# Patient Record
Sex: MALE | Race: WHITE | NOT HISPANIC OR LATINO | Employment: OTHER | URBAN - NONMETROPOLITAN AREA
[De-identification: names, ages, dates, MRNs, and addresses within clinical notes are randomized per-mention and may not be internally consistent; named-entity substitution may affect disease eponyms.]

---

## 2022-04-20 ENCOUNTER — LAB (OUTPATIENT)
Dept: LAB | Facility: HOSPITAL | Age: 67
End: 2022-04-20

## 2022-04-20 ENCOUNTER — CONSULT (OUTPATIENT)
Dept: ONCOLOGY | Facility: CLINIC | Age: 67
End: 2022-04-20

## 2022-04-20 VITALS
SYSTOLIC BLOOD PRESSURE: 140 MMHG | OXYGEN SATURATION: 98 % | BODY MASS INDEX: 26.81 KG/M2 | WEIGHT: 181 LBS | HEART RATE: 58 BPM | DIASTOLIC BLOOD PRESSURE: 88 MMHG | HEIGHT: 69 IN | RESPIRATION RATE: 16 BRPM | TEMPERATURE: 97.1 F

## 2022-04-20 DIAGNOSIS — R53.81 MALAISE AND FATIGUE: ICD-10-CM

## 2022-04-20 DIAGNOSIS — D75.839 THROMBOCYTOSIS, UNSPECIFIED: Primary | ICD-10-CM

## 2022-04-20 DIAGNOSIS — R53.83 MALAISE AND FATIGUE: ICD-10-CM

## 2022-04-20 DIAGNOSIS — R79.89 OTHER SPECIFIED ABNORMAL FINDINGS OF BLOOD CHEMISTRY: ICD-10-CM

## 2022-04-20 DIAGNOSIS — D75.839 THROMBOCYTOSIS, UNSPECIFIED: ICD-10-CM

## 2022-04-20 DIAGNOSIS — Z72.0 TOBACCO USE: ICD-10-CM

## 2022-04-20 PROBLEM — E78.5 HYPERLIPIDEMIA: Status: ACTIVE | Noted: 2022-04-20

## 2022-04-20 LAB
ALBUMIN SERPL-MCNC: 4.5 G/DL (ref 3.5–5.2)
ALBUMIN/GLOB SERPL: 1.6 G/DL
ALP SERPL-CCNC: 82 U/L (ref 39–117)
ALT SERPL W P-5'-P-CCNC: 14 U/L (ref 1–41)
ANION GAP SERPL CALCULATED.3IONS-SCNC: 9 MMOL/L (ref 5–15)
AST SERPL-CCNC: 16 U/L (ref 1–40)
BASOPHILS # BLD AUTO: 0.03 10*3/MM3 (ref 0–0.2)
BASOPHILS NFR BLD AUTO: 0.5 % (ref 0–1.5)
BILIRUB SERPL-MCNC: 0.4 MG/DL (ref 0–1.2)
BUN SERPL-MCNC: 18 MG/DL (ref 8–23)
BUN/CREAT SERPL: 24 (ref 7–25)
CALCIUM SPEC-SCNC: 9.5 MG/DL (ref 8.6–10.5)
CHLORIDE SERPL-SCNC: 103 MMOL/L (ref 98–107)
CO2 SERPL-SCNC: 26 MMOL/L (ref 22–29)
CREAT SERPL-MCNC: 0.75 MG/DL (ref 0.76–1.27)
CRP SERPL-MCNC: <0.3 MG/DL (ref 0–0.5)
DEPRECATED RDW RBC AUTO: 46.5 FL (ref 37–54)
EGFRCR SERPLBLD CKD-EPI 2021: 99.5 ML/MIN/1.73
EOSINOPHIL # BLD AUTO: 0.14 10*3/MM3 (ref 0–0.4)
EOSINOPHIL NFR BLD AUTO: 2.2 % (ref 0.3–6.2)
ERYTHROCYTE [DISTWIDTH] IN BLOOD BY AUTOMATED COUNT: 13.7 % (ref 12.3–15.4)
FERRITIN SERPL-MCNC: 171.2 NG/ML (ref 30–400)
FOLATE SERPL-MCNC: 12.4 NG/ML (ref 4.78–24.2)
GLOBULIN UR ELPH-MCNC: 2.8 GM/DL
GLUCOSE SERPL-MCNC: 113 MG/DL (ref 65–99)
HCT VFR BLD AUTO: 41.2 % (ref 37.5–51)
HGB BLD-MCNC: 13.6 G/DL (ref 13–17.7)
IMM GRANULOCYTES # BLD AUTO: 0.03 10*3/MM3 (ref 0–0.05)
IMM GRANULOCYTES NFR BLD AUTO: 0.5 % (ref 0–0.5)
IRON 24H UR-MRATE: 73 MCG/DL (ref 59–158)
IRON SATN MFR SERPL: 19 % (ref 20–50)
LDH SERPL-CCNC: 227 U/L (ref 135–225)
LYMPHOCYTES # BLD AUTO: 1.89 10*3/MM3 (ref 0.7–3.1)
LYMPHOCYTES NFR BLD AUTO: 29.6 % (ref 19.6–45.3)
MCH RBC QN AUTO: 30.6 PG (ref 26.6–33)
MCHC RBC AUTO-ENTMCNC: 33 G/DL (ref 31.5–35.7)
MCV RBC AUTO: 92.6 FL (ref 79–97)
MONOCYTES # BLD AUTO: 0.55 10*3/MM3 (ref 0.1–0.9)
MONOCYTES NFR BLD AUTO: 8.6 % (ref 5–12)
NEUTROPHILS NFR BLD AUTO: 3.74 10*3/MM3 (ref 1.7–7)
NEUTROPHILS NFR BLD AUTO: 58.6 % (ref 42.7–76)
NRBC BLD AUTO-RTO: 0 /100 WBC (ref 0–0.2)
PLATELET # BLD AUTO: 563 10*3/MM3 (ref 140–450)
PMV BLD AUTO: 8.4 FL (ref 6–12)
POTASSIUM SERPL-SCNC: 4.7 MMOL/L (ref 3.5–5.2)
PROT SERPL-MCNC: 7.3 G/DL (ref 6–8.5)
RBC # BLD AUTO: 4.45 10*6/MM3 (ref 4.14–5.8)
SODIUM SERPL-SCNC: 138 MMOL/L (ref 136–145)
TIBC SERPL-MCNC: 393 MCG/DL (ref 298–536)
TRANSFERRIN SERPL-MCNC: 264 MG/DL (ref 200–360)
VIT B12 BLD-MCNC: 588 PG/ML (ref 211–946)
WBC NRBC COR # BLD: 6.38 10*3/MM3 (ref 3.4–10.8)

## 2022-04-20 PROCEDURE — 84466 ASSAY OF TRANSFERRIN: CPT

## 2022-04-20 PROCEDURE — 82728 ASSAY OF FERRITIN: CPT

## 2022-04-20 PROCEDURE — 82746 ASSAY OF FOLIC ACID SERUM: CPT

## 2022-04-20 PROCEDURE — 36415 COLL VENOUS BLD VENIPUNCTURE: CPT

## 2022-04-20 PROCEDURE — 83540 ASSAY OF IRON: CPT

## 2022-04-20 PROCEDURE — 82607 VITAMIN B-12: CPT

## 2022-04-20 PROCEDURE — 85025 COMPLETE CBC W/AUTO DIFF WBC: CPT

## 2022-04-20 PROCEDURE — 99204 OFFICE O/P NEW MOD 45 MIN: CPT | Performed by: NURSE PRACTITIONER

## 2022-04-20 PROCEDURE — 83615 LACTATE (LD) (LDH) ENZYME: CPT

## 2022-04-20 PROCEDURE — 86140 C-REACTIVE PROTEIN: CPT

## 2022-04-20 PROCEDURE — 80053 COMPREHEN METABOLIC PANEL: CPT

## 2022-04-20 RX ORDER — ASPIRIN 81 MG/1
81 TABLET ORAL DAILY
COMMUNITY

## 2022-04-20 RX ORDER — PRAVASTATIN SODIUM 40 MG
TABLET ORAL
COMMUNITY

## 2022-04-20 NOTE — PROGRESS NOTES
"[facility] HEMATOLOGY & ONCOLOGY  [address]  [facility] [address] [phone]    Patient Name: Austin Walton  Encounter Date: 04/20/2022  YOB: 1955  Patient Number: [id number]    Initial Note    REASON FOR CONSULTATION: Patient states \" high platelets \".    HISTORY OF PRESENT ILLNESS: Austin Walton is a 66 y.o. male referred by Henny Sanchez MD for diagnostic and management recommendations for thrombocytosis. History is obtained from patient. History is considered to be accurate.    He has health history significant for hypelipidemia.      He states that during his annual wellness visit in 2021, his platelets were elevated.  This year he had the same result so his provider referred him for evaluation.  The referring office note does not have labs attached.  It mentions only the history of elevated platelets and the last reading was 611.  Wife contacted that office during this visit to request those labs be forwarded to us.      He had colonscopy 4/14 at [facility], Dr. Chaudhari. Wife reports it was unremarkable other than slight enlarged prostate.     He has smoked for over 50 years and smokes approx 1 PPD or a little less.   He does report some chronic fatigue but has no acute complaint.    LABS    No results for input(s): HGB, HCT, MCV, WBC, RDW, MPV, PLT, AUTOIGPER, NEUTROABS, LYMPHSABS, MONOSABS, EOSABS, BASOSABS, AUTOIGNUM, NRBC, NEUTRELPCTM, MONOPCT, BASOPCT, ATYLMPCT, ANISOCYTOSIS, GIANTPLTS in the last 69615 hours.    Invalid input(s): LYMPHOCPCT, ABCMORPH    No results for input(s): GLUCOSE, NA, K, CO2, CL, ANIONGAP, CREATININE, BUN, BCR, CALCIUM, EGFRIFNONA, ALKPHOS, PROTEINTOT, ALT, AST, BILITOT, ALBUMIN, GLOB in the last 83782 hours.    Invalid input(s): LABIL    No results for input(s): MSPIKE, KAPPALAMB, IGLFLC, URICACID, FREEKAPPAL, CEA, LDH, REFLABREPO in the last 46951 hours.    No results for " input(s): IRON, TIBC, LABIRON, FERRITIN, Z9YHFHX, TSH, FOLATE in the last 28055 hours.    Invalid input(s): VITB12      PAST MEDICAL HISTORY:  ALLERGIES:  No Known Allergies  CURRENT MEDICATIONS:  Outpatient Encounter Medications as of 4/20/2022   Medication Sig Dispense Refill   • aspirin 81 MG EC tablet Take 81 mg by mouth Daily.     • pravastatin (PRAVACHOL) 40 MG tablet pravastatin 40 mg tablet   TAKE 1 TABLET BY MOUTH EVERY EVENING NEEDS APPT FOR REFILL       No facility-administered encounter medications on file as of 4/20/2022.     ADULT ILLNESSES:  Patient Active Problem List   Diagnosis Code   • Hyperlipidemia E78.5     SURGERIES:  Past Surgical History:   Procedure Laterality Date   • ANKLE SURGERY      screws in ankles   • CARPAL TUNNEL RELEASE     • COLONOSCOPY  04/13/2022   • HERNIA REPAIR     • TOE AMPUTATION       HEALTH MAINTENANCE ITEMS:  Health Maintenance Due   Topic Date Due   • COLORECTAL CANCER SCREENING  Never done   • TDAP/TD VACCINES (1 - Tdap) Never done   • ZOSTER VACCINE (1 of 2) Never done   • LUNG CANCER SCREENING  Never done   • Pneumococcal Vaccine 65+ (2 - PPSV23 or PCV20) 10/19/2021   • COVID-19 Vaccine (2 - Moderna 3-dose series) 12/01/2021   • HEPATITIS C SCREENING  Never done   • ANNUAL WELLNESS VISIT  Never done   • LIPID PANEL  Never done       <no information>  Last Completed Colonoscopy     This patient has no relevant Health Maintenance data.        Immunization History   Administered Date(s) Administered   • COVID-19 (MODERNA) 1st, 2nd, 3rd Dose Only 11/03/2021   • Flublok 18+yrs 10/19/2020   • Fluzone Split Quad (Multi-dose) 10/11/2018   • Influenza, Unspecified 11/06/2016, 10/11/2018   • Pneumococcal Conjugate 13-Valent (PCV13) 10/19/2020     Last Completed Mammogram     This patient has no relevant Health Maintenance data.            FAMILY HISTORY:  No family history on file.  SOCIAL HISTORY:  Social History     Socioeconomic History   • Marital status:     Tobacco Use   • Smoking status: Current Every Day Smoker     Packs/day: 0.50     Years: 50.00     Pack years: 25.00     Types: Cigarettes   • Smokeless tobacco: Never Used   Vaping Use   • Vaping Use: Never used   Substance and Sexual Activity   • Alcohol use: Not Currently   • Drug use: Not Currently   • Sexual activity: Defer       REVIEW OF SYSTEMS:  Review of Systems   Constitutional: Negative for activity change, appetite change, chills, diaphoresis, fatigue, fever, unexpected weight gain and unexpected weight loss.   HENT: Positive for congestion and sinus pressure. Negative for dental problem, ear discharge, ear pain, facial swelling, hearing loss, mouth sores, nosebleeds, rhinorrhea, sneezing, sore throat, swollen glands, tinnitus, trouble swallowing and voice change.    Eyes: Negative for blurred vision, double vision, photophobia, pain, discharge, redness, itching and visual disturbance.   Respiratory: Negative for apnea, cough, choking, chest tightness, shortness of breath, wheezing and stridor.    Cardiovascular: Negative for chest pain, palpitations and leg swelling.   Gastrointestinal: Negative for abdominal distention, abdominal pain, anal bleeding, blood in stool, constipation, diarrhea, nausea, rectal pain, vomiting, GERD and indigestion.   Endocrine: Negative for cold intolerance, heat intolerance, polydipsia, polyphagia and polyuria.   Genitourinary: Positive for frequency. Negative for breast discharge, decreased libido, decreased urine volume, difficulty urinating, discharge, dysuria, flank pain, genital sores, hematuria, nocturia, penile pain, erectile dysfunction, penile swelling, scrotal swelling, testicular pain, urgency and urinary incontinence.   Musculoskeletal: Positive for arthralgias and myalgias. Negative for back pain, gait problem, joint swelling, neck pain, neck stiffness and bursitis.   Skin: Negative for color change, dry skin, pallor, rash, skin lesions, wound and bruise.  "  Allergic/Immunologic: Negative for environmental allergies, food allergies and immunocompromised state.   Neurological: Negative for dizziness, tremors, seizures, syncope, facial asymmetry, speech difficulty, weakness, light-headedness, numbness, headache, memory problem and confusion.   Hematological: Negative for adenopathy. Does not bruise/bleed easily.   Psychiatric/Behavioral: Negative for agitation, behavioral problems, decreased concentration, dysphoric mood, hallucinations, self-injury, sleep disturbance, suicidal ideas, negative for hyperactivity, depressed mood and stress. The patient is not nervous/anxious.        /88   Pulse 58   Temp 97.1 °F (36.2 °C) (Temporal)   Resp 16   Ht 175.3 cm (69\")   Wt 82.1 kg (181 lb)   SpO2 98%   BMI 26.73 kg/m²  Body surface area is 1.98 meters squared.    Pain Score    04/20/22 0852   PainSc: 0-No pain       Physical Exam:  Physical Exam  Constitutional:       General: He is not in acute distress.     Appearance: Normal appearance. He is not ill-appearing.   HENT:      Head: Normocephalic and atraumatic.   Eyes:      General: No scleral icterus.        Right eye: No discharge.         Left eye: No discharge.      Pupils: Pupils are equal, round, and reactive to light.   Cardiovascular:      Rate and Rhythm: Normal rate and regular rhythm.      Heart sounds: No murmur heard.    No friction rub. No gallop.   Pulmonary:      Effort: Pulmonary effort is normal.      Breath sounds: Wheezing (Trace left upper lobe) present.   Abdominal:      General: Bowel sounds are normal.      Palpations: Abdomen is soft.      Tenderness: There is no abdominal tenderness. There is no guarding or rebound.   Musculoskeletal:         General: No swelling or tenderness. Normal range of motion.      Cervical back: Normal range of motion and neck supple. No tenderness.   Skin:     General: Skin is warm and dry.      Coloration: Skin is not jaundiced.      Findings: No bruising or " erythema.   Neurological:      General: No focal deficit present.      Mental Status: He is alert and oriented to person, place, and time.   Psychiatric:         Mood and Affect: Mood normal.         Behavior: Behavior normal.         Judgment: Judgment normal.         Austin Walton reports a pain score of 0.  No intervention is indicted.       ASSESSMENT / PLAN:    1. Thrombocytosis, unspecified    2. Malaise and fatigue    3. Tobacco use    4. Other specified abnormal findings of blood chemistry         PLAN:   1.   regarding the reason for the referral.   2.   regarding thrombocytosis and differential diagnosis considerations   3.  Labs for   Orders Placed This Encounter   Procedures   • CBC (No Diff)   • Comprehensive Metabolic Panel   • Lactate Dehydrogenase   • Vitamin B12   • Folate   • MPL Mutation Analysis   • C-reactive Protein   • Iron Profile   • Ferritin   • CBC & Differential      4.  Stable for close observation.   6.  Continue current medications.   7.  Continue care per primary care physician.   8.  Care discussed with patient.  Understanding expressed.  Patient agreeable with plan.   9.  Return to office in one week to review labs and discuss treatment plan.  10.  Further recommendations pending.  11.  Pt advised smoking cessation.     Thank you for the referral.    I spent 40 minutes caring for Austin on this date of service. This time includes time spent by me in the following activities: preparing for the visit, obtaining and/or reviewing a separately obtained history, performing a medically appropriate examination and/or evaluation, counseling and educating the patient/family/caregiver, ordering medications, tests, or procedures and documenting information in the medical record.

## 2022-04-27 LAB — REF LAB TEST METHOD: NORMAL

## 2022-04-28 ENCOUNTER — OFFICE VISIT (OUTPATIENT)
Dept: ONCOLOGY | Facility: CLINIC | Age: 67
End: 2022-04-28

## 2022-04-28 ENCOUNTER — PATIENT ROUNDING (BHMG ONLY) (OUTPATIENT)
Dept: ONCOLOGY | Facility: CLINIC | Age: 67
End: 2022-04-28

## 2022-04-28 VITALS
HEIGHT: 69 IN | TEMPERATURE: 97.4 F | OXYGEN SATURATION: 99 % | WEIGHT: 179.4 LBS | SYSTOLIC BLOOD PRESSURE: 122 MMHG | RESPIRATION RATE: 16 BRPM | BODY MASS INDEX: 26.57 KG/M2 | DIASTOLIC BLOOD PRESSURE: 82 MMHG | HEART RATE: 72 BPM

## 2022-04-28 DIAGNOSIS — D75.839 THROMBOCYTOSIS, UNSPECIFIED: ICD-10-CM

## 2022-04-28 DIAGNOSIS — D50.9 IRON DEFICIENCY ANEMIA, UNSPECIFIED IRON DEFICIENCY ANEMIA TYPE: Primary | ICD-10-CM

## 2022-04-28 LAB — REF LAB TEST METHOD: NORMAL

## 2022-04-28 PROCEDURE — 99213 OFFICE O/P EST LOW 20 MIN: CPT | Performed by: NURSE PRACTITIONER

## 2022-04-28 RX ORDER — DOXYCYCLINE HYCLATE 50 MG/1
324 CAPSULE, GELATIN COATED ORAL
Qty: 30 TABLET | Refills: 2 | Status: SHIPPED | OUTPATIENT
Start: 2022-04-28

## 2022-04-28 RX ORDER — IBUPROFEN 200 MG
200 TABLET ORAL EVERY 6 HOURS PRN
COMMUNITY

## 2022-04-28 NOTE — PROGRESS NOTES
[facility] HEMATOLOGY & ONCOLOGY  [address]  [facility] [address] [phone]    Patient Name: Austin Walton  Encounter Date: 04/28/2022  YOB: 1955  Patient Number: [id number]    Hematology / Oncology Progress Note    CHIEF COMPLAINT:  Review labs    HPI / REASON FOR VISIT: Austin Walton is a 66 y.o. male who is followed by this office for elevated platelets.     He states that during his annual wellness visit in 2021, his platelets were elevated.  This year he had the same result so his provider referred him for evaluation.  The referring office note does not have labs attached.  It mentions only the history of elevated platelets and the last reading was 611.  Wife contacted that office during this visit to request those labs be forwarded to us.       He had colonscopy 4/14 at [facility], Dr. Chaudhari. Wife reports it was unremarkable other than slight enlarged prostate.      He has smoked for over 50 years and smokes approx 1 PPD or a little less.   He does report some chronic fatigue but has no acute complaint.    He was seen in our office on 4/20/22 for consult.  He presents today to review labs drawn at that visit.  LDH elevated at 227.  Iron saturation slightly decreased 19%.  CBC unremarkable other than elevated platelets 563.      MPL and LACY 2 profiles were both negative.       LABS    Lab Results - Last 18 Months   Lab Units 04/20/22  0945   HEMOGLOBIN g/dL 13.6   HEMATOCRIT % 41.2   MCV fL 92.6   WBC 10*3/mm3 6.38   RDW % 13.7   MPV fL 8.4   PLATELETS 10*3/mm3 563*   IMM GRAN % % 0.5   NEUTROS ABS 10*3/mm3 3.74   LYMPHS ABS 10*3/mm3 1.89   MONOS ABS 10*3/mm3 0.55   EOS ABS 10*3/mm3 0.14   BASOS ABS 10*3/mm3 0.03   IMMATURE GRANS (ABS) 10*3/mm3 0.03   NRBC /100 WBC 0.0       Lab Results - Last 18 Months   Lab Units 04/20/22  0945   GLUCOSE mg/dL 113*   SODIUM mmol/L 138   POTASSIUM mmol/L 4.7   CO2 mmol/L  26.0   CHLORIDE mmol/L 103   ANION GAP mmol/L 9.0   CREATININE mg/dL 0.75*   BUN mg/dL 18   BUN / CREAT RATIO  24.0   CALCIUM mg/dL 9.5   ALK PHOS U/L 82   TOTAL PROTEIN g/dL 7.3   ALT (SGPT) U/L 14   AST (SGOT) U/L 16   BILIRUBIN mg/dL 0.4   ALBUMIN g/dL 4.50   GLOBULIN gm/dL 2.8       Lab Results - Last 18 Months   Lab Units 04/20/22  0945   LDH U/L 227*   REFERENCE LAB REPORT  See Attached Report  See Attached Report       Lab Results - Last 18 Months   Lab Units 04/20/22  0945   IRON mcg/dL 73   TIBC mcg/dL 393   IRON SATURATION % 19*   FERRITIN ng/mL 171.20   FOLATE ng/mL 12.40         PAST MEDICAL HISTORY:  ALLERGIES:  No Known Allergies  CURRENT MEDICATIONS:  Outpatient Encounter Medications as of 4/28/2022   Medication Sig Dispense Refill   • aspirin 81 MG EC tablet Take 81 mg by mouth Daily.     • ibuprofen (ADVIL,MOTRIN) 200 MG tablet Take 200 mg by mouth Every 6 (Six) Hours As Needed for Mild Pain .     • pravastatin (PRAVACHOL) 40 MG tablet pravastatin 40 mg tablet   TAKE 1 TABLET BY MOUTH EVERY EVENING NEEDS APPT FOR REFILL     • ferrous gluconate (FERGON) 324 MG tablet Take 1 tablet by mouth Daily With Breakfast. 30 tablet 2     No facility-administered encounter medications on file as of 4/28/2022.     ADULT ILLNESSES:  Patient Active Problem List   Diagnosis Code   • Hyperlipidemia E78.5     SURGERIES:  Past Surgical History:   Procedure Laterality Date   • ANKLE SURGERY      screws in ankles   • CARPAL TUNNEL RELEASE     • COLONOSCOPY  04/13/2022   • HERNIA REPAIR     • TOE AMPUTATION       HEALTH MAINTENANCE ITEMS:  Health Maintenance Due   Topic Date Due   • COLORECTAL CANCER SCREENING  Never done   • TDAP/TD VACCINES (1 - Tdap) Never done   • ZOSTER VACCINE (1 of 2) Never done   • LUNG CANCER SCREENING  Never done   • Pneumococcal Vaccine 65+ (2 - PPSV23 or PCV20) 10/19/2021   • COVID-19 Vaccine (2 - Moderna 3-dose series) 12/01/2021   • HEPATITIS C SCREENING  Never done   • ANNUAL WELLNESS  "VISIT  Never done   • LIPID PANEL  Never done       <no information>  Last Completed Colonoscopy     This patient has no relevant Health Maintenance data.        Immunization History   Administered Date(s) Administered   • COVID-19 (MODERNA) 1st, 2nd, 3rd Dose Only 11/03/2021   • Flublok 18+yrs 10/19/2020   • Fluzone Split Quad (Multi-dose) 10/11/2018   • Influenza, Unspecified 11/06/2016, 10/11/2018   • Pneumococcal Conjugate 13-Valent (PCV13) 10/19/2020     Last Completed Mammogram     This patient has no relevant Health Maintenance data.            FAMILY HISTORY:  No family history on file.  SOCIAL HISTORY:  Social History     Socioeconomic History   • Marital status:    Tobacco Use   • Smoking status: Current Every Day Smoker     Packs/day: 0.50     Years: 50.00     Pack years: 25.00     Types: Cigarettes   • Smokeless tobacco: Never Used   Vaping Use   • Vaping Use: Never used   Substance and Sexual Activity   • Alcohol use: Not Currently   • Drug use: Not Currently   • Sexual activity: Defer       REVIEW OF SYSTEMS:  Review of Systems    /82   Pulse 72   Temp 97.4 °F (36.3 °C) (Temporal)   Resp 16   Ht 175.3 cm (69\")   Wt 81.4 kg (179 lb 6.4 oz)   SpO2 99%   BMI 26.49 kg/m²  Body surface area is 1.97 meters squared.    Pain Score    04/28/22 1024   PainSc: 0-No pain       Physical Exam:  Physical Exam  Constitutional:       Appearance: Normal appearance.   HENT:      Head: Normocephalic.   Eyes:      Extraocular Movements: Extraocular movements intact.   Cardiovascular:      Rate and Rhythm: Normal rate and regular rhythm.   Pulmonary:      Effort: Pulmonary effort is normal.      Breath sounds: Normal breath sounds.   Abdominal:      General: Bowel sounds are normal. There is no distension.      Tenderness: There is no abdominal tenderness.   Musculoskeletal:         General: Normal range of motion.   Skin:     General: Skin is warm and dry.   Neurological:      General: No focal deficit " present.      Mental Status: He is alert and oriented to person, place, and time.   Psychiatric:         Mood and Affect: Mood normal.         Behavior: Behavior normal.         Austin Walton reports a pain score of 0.  No intervention indicated.         ASSESSMENT / PLAN    1. Iron deficiency anemia, unspecified iron deficiency anemia type    2. Thrombocytosis, unspecified         ASSESSMENT:    1.  Iron saturation  slightly low at 19%  2.  Platelets are elevated at 563 which can be reactive.  3.  LACY 2 [id number] not detected  4.  MPL mutation not detected.    PLAN:  1.  Pt will take low dose iron daily.   2.  Pt advised oral iron can cause constipation and dark stool.   3.  He can take colace / Miralax as needed.    4.  Pt will continue all medications and treatment plans per PCP and any other providers.    5.  He will RTC in 6 weeks.  6.  Patient will have preoffice labs.   We will monitor for improvement in platelets with improvement of iron.  Orders Placed This Encounter   Procedures   • Comprehensive Metabolic Panel   • Iron Profile   • Ferritin   • CBC & Differential     Care discussed with patient.  Understanding expressed.  Patient agreeable with plan.    I spent 20 minutes caring for Austin on this date of service. This time includes time spent by me in the following activities: preparing for the visit, reviewing tests, performing a medically appropriate examination and/or evaluation, counseling and educating the patient/family/caregiver, ordering medications, tests, or procedures and documenting information in the medical record.

## 2022-04-28 NOTE — PROGRESS NOTES
April 28, 2022    Hello, may I speak with Austin Walton?    My name is MAINE    I am  with [facility] HEMATOLOGY & ONCOLOGY  [address]  [facility] [facility] [address] [phone].    Before we get started may I verify your date of birth? 1955    I am calling to officially welcome you to our practice and ask about your recent visit. Is this a good time to talk? YES    Tell me about your visit with us. What things went well?  EVERYTHING WENT GOOD        We're always looking for ways to make our patients' experiences even better. Do you have recommendations on ways we may improve?  NO I DON'T    Overall were you satisfied with your first visit to our practice? YES       I appreciate you taking the time to speak with me today. Is there anything else I can do for you? NO      Thank you, and have a great day.

## 2022-06-09 ENCOUNTER — LAB (OUTPATIENT)
Dept: LAB | Facility: HOSPITAL | Age: 67
End: 2022-06-09

## 2022-06-09 ENCOUNTER — OFFICE VISIT (OUTPATIENT)
Dept: ONCOLOGY | Facility: CLINIC | Age: 67
End: 2022-06-09

## 2022-06-09 VITALS
WEIGHT: 180.9 LBS | OXYGEN SATURATION: 96 % | BODY MASS INDEX: 26.79 KG/M2 | TEMPERATURE: 97.2 F | DIASTOLIC BLOOD PRESSURE: 88 MMHG | RESPIRATION RATE: 16 BRPM | HEIGHT: 69 IN | HEART RATE: 92 BPM | SYSTOLIC BLOOD PRESSURE: 128 MMHG

## 2022-06-09 DIAGNOSIS — E61.1 IRON DEFICIENCY: ICD-10-CM

## 2022-06-09 DIAGNOSIS — Z12.2 SCREENING FOR LUNG CANCER: ICD-10-CM

## 2022-06-09 DIAGNOSIS — D75.839 THROMBOCYTOSIS, UNSPECIFIED: ICD-10-CM

## 2022-06-09 DIAGNOSIS — D50.9 IRON DEFICIENCY ANEMIA, UNSPECIFIED IRON DEFICIENCY ANEMIA TYPE: ICD-10-CM

## 2022-06-09 DIAGNOSIS — Z72.0 TOBACCO USE: Primary | ICD-10-CM

## 2022-06-09 LAB
ALBUMIN SERPL-MCNC: 4.5 G/DL (ref 3.5–5.2)
ALBUMIN/GLOB SERPL: 1.5 G/DL
ALP SERPL-CCNC: 95 U/L (ref 39–117)
ALT SERPL W P-5'-P-CCNC: 14 U/L (ref 1–41)
ANION GAP SERPL CALCULATED.3IONS-SCNC: 9 MMOL/L (ref 5–15)
AST SERPL-CCNC: 14 U/L (ref 1–40)
BASOPHILS # BLD AUTO: 0.04 10*3/MM3 (ref 0–0.2)
BASOPHILS NFR BLD AUTO: 0.5 % (ref 0–1.5)
BILIRUB SERPL-MCNC: 0.4 MG/DL (ref 0–1.2)
BUN SERPL-MCNC: 19 MG/DL (ref 8–23)
BUN/CREAT SERPL: 23.5 (ref 7–25)
CALCIUM SPEC-SCNC: 9.8 MG/DL (ref 8.6–10.5)
CHLORIDE SERPL-SCNC: 101 MMOL/L (ref 98–107)
CO2 SERPL-SCNC: 26 MMOL/L (ref 22–29)
CREAT SERPL-MCNC: 0.81 MG/DL (ref 0.76–1.27)
CYTOLOGIST CVX/VAG CYTO: NORMAL
DEPRECATED RDW RBC AUTO: 45.1 FL (ref 37–54)
EGFRCR SERPLBLD CKD-EPI 2021: 96.6 ML/MIN/1.73
EOSINOPHIL # BLD AUTO: 0.11 10*3/MM3 (ref 0–0.4)
EOSINOPHIL NFR BLD AUTO: 1.3 % (ref 0.3–6.2)
ERYTHROCYTE [DISTWIDTH] IN BLOOD BY AUTOMATED COUNT: 13.2 % (ref 12.3–15.4)
FERRITIN SERPL-MCNC: 143.9 NG/ML (ref 30–400)
GLOBULIN UR ELPH-MCNC: 3.1 GM/DL
GLUCOSE SERPL-MCNC: 107 MG/DL (ref 65–99)
HCT VFR BLD AUTO: 45 % (ref 37.5–51)
HGB BLD-MCNC: 14.9 G/DL (ref 13–17.7)
IMM GRANULOCYTES # BLD AUTO: 0.03 10*3/MM3 (ref 0–0.05)
IMM GRANULOCYTES NFR BLD AUTO: 0.4 % (ref 0–0.5)
IRON 24H UR-MRATE: 78 MCG/DL (ref 59–158)
IRON SATN MFR SERPL: 18 % (ref 20–50)
LYMPHOCYTES # BLD AUTO: 2.68 10*3/MM3 (ref 0.7–3.1)
LYMPHOCYTES NFR BLD AUTO: 31.6 % (ref 19.6–45.3)
MCH RBC QN AUTO: 30.4 PG (ref 26.6–33)
MCHC RBC AUTO-ENTMCNC: 33.1 G/DL (ref 31.5–35.7)
MCV RBC AUTO: 91.8 FL (ref 79–97)
MONOCYTES # BLD AUTO: 0.77 10*3/MM3 (ref 0.1–0.9)
MONOCYTES NFR BLD AUTO: 9.1 % (ref 5–12)
NEUTROPHILS NFR BLD AUTO: 4.84 10*3/MM3 (ref 1.7–7)
NEUTROPHILS NFR BLD AUTO: 57.1 % (ref 42.7–76)
NRBC BLD AUTO-RTO: 0 /100 WBC (ref 0–0.2)
PATH INTERP BLD-IMP: NORMAL
PLATELET # BLD AUTO: 578 10*3/MM3 (ref 140–450)
PMV BLD AUTO: 8.2 FL (ref 6–12)
POTASSIUM SERPL-SCNC: 4.2 MMOL/L (ref 3.5–5.2)
PROT SERPL-MCNC: 7.6 G/DL (ref 6–8.5)
RBC # BLD AUTO: 4.9 10*6/MM3 (ref 4.14–5.8)
SODIUM SERPL-SCNC: 136 MMOL/L (ref 136–145)
TIBC SERPL-MCNC: 426 MCG/DL (ref 298–536)
TRANSFERRIN SERPL-MCNC: 286 MG/DL (ref 200–360)
WBC NRBC COR # BLD: 8.47 10*3/MM3 (ref 3.4–10.8)

## 2022-06-09 PROCEDURE — 84466 ASSAY OF TRANSFERRIN: CPT

## 2022-06-09 PROCEDURE — 99497 ADVNCD CARE PLAN 30 MIN: CPT | Performed by: NURSE PRACTITIONER

## 2022-06-09 PROCEDURE — 80053 COMPREHEN METABOLIC PANEL: CPT

## 2022-06-09 PROCEDURE — 99214 OFFICE O/P EST MOD 30 MIN: CPT | Performed by: NURSE PRACTITIONER

## 2022-06-09 PROCEDURE — 85025 COMPLETE CBC W/AUTO DIFF WBC: CPT

## 2022-06-09 PROCEDURE — 82728 ASSAY OF FERRITIN: CPT

## 2022-06-09 PROCEDURE — 36415 COLL VENOUS BLD VENIPUNCTURE: CPT

## 2022-06-09 PROCEDURE — 85060 BLOOD SMEAR INTERPRETATION: CPT

## 2022-06-09 PROCEDURE — 83540 ASSAY OF IRON: CPT

## 2022-06-09 NOTE — PROGRESS NOTES
[facility] GROUP HEMATOLOGY & ONCOLOGY  [address]  [facility] [place] [address] [phone]    Patient Name: Austin Walton  Encounter Date: 06/09/2022  YOB: 1955  Patient Number: [id number]    Hematology / Oncology Progress Note    CHIEF COMPLAINT:  Review labs    HPI / REASON FOR VISIT: Austin Walton is a 67 y.o. male who is followed by this office for elevated platelets.     He states that during his annual wellness visit in 2021, his platelets were elevated.  This year he had the same result so his provider referred him for evaluation.  The referring office note does not have labs attached.  It mentions only the history of elevated platelets and the last reading was 611.  Wife contacted that office during this visit to request those labs be forwarded to us.       He had colonscopy 4/14 at [facility], Dr. Chaudhari. Wife reports it was unremarkable other than slightly enlarged prostate.      He has smoked for over 50 years and smokes approx 1 PPD or a little less.   He does report some chronic fatigue but has no acute complaint.    He was seen in our office on 4/20/22 for consult.  He presents today to review labs drawn at that visit.  LDH elevated at 227.  Iron saturation slightly decreased 19%.  CBC unremarkable other than elevated platelets 563.      MPL and LACY 2 profiles were both negative.  He was started on oral iron related to the very slightly low saturation as iron deficiency can cause thrombocytosis.    06/09/2022 Follow Up    Pt presents to clinic today with his wife to follow up from starting  Oral iron daily.  He has tolerated it well and had no complaint today.     Labs were drawn and reviewed with patient.  CBC with Hgb 14.9, Hct 45, Plt 578.  Anemia panel hadn't resulted at time of visit.     Peripheral Smear:  Isolated mild thrombocytosis (by indices).  Per the electronic medical record, the patient has a  minimally decreased iron saturation, but no evidence of true iron deficiency anemia by CBC parameters at this time.  While mild thrombocytosis can be associated with iron deficiency, other causes of elevated platelet counts include infection/inflammatory conditions (i.e. acute phase reactant), certain medications, other stressors, etc.  Apparently, the patient did have molecular markers for MPN tested (JAK2 and MPL), which were reportedly negative.     No discrete dyspoiesis is identified on peripheral smear.  No significant left-shift, blasts, or other overt abnormalities.      LABS    Lab Results - Last 18 Months   Lab Units 06/09/22  0956 04/20/22  0945   HEMOGLOBIN g/dL 14.9 13.6   HEMATOCRIT % 45.0 41.2   MCV fL 91.8 92.6   WBC 10*3/mm3 8.47 6.38   RDW % 13.2 13.7   MPV fL 8.2 8.4   PLATELETS 10*3/mm3 578* 563*   IMM GRAN % % 0.4 0.5   NEUTROS ABS 10*3/mm3 4.84 3.74   LYMPHS ABS 10*3/mm3 2.68 1.89   MONOS ABS 10*3/mm3 0.77 0.55   EOS ABS 10*3/mm3 0.11 0.14   BASOS ABS 10*3/mm3 0.04 0.03   IMMATURE GRANS (ABS) 10*3/mm3 0.03 0.03   NRBC /100 WBC 0.0 0.0       Lab Results - Last 18 Months   Lab Units 06/09/22  0956 04/20/22  0945   GLUCOSE mg/dL 107* 113*   SODIUM mmol/L 136 138   POTASSIUM mmol/L 4.2 4.7   CO2 mmol/L 26.0 26.0   CHLORIDE mmol/L 101 103   ANION GAP mmol/L 9.0 9.0   CREATININE mg/dL 0.81 0.75*   BUN mg/dL 19 18   BUN / CREAT RATIO  23.5 24.0   CALCIUM mg/dL 9.8 9.5   ALK PHOS U/L 95 82   TOTAL PROTEIN g/dL 7.6 7.3   ALT (SGPT) U/L 14 14   AST (SGOT) U/L 14 16   BILIRUBIN mg/dL 0.4 0.4   ALBUMIN g/dL 4.50 4.50   GLOBULIN gm/dL 3.1 2.8       Lab Results - Last 18 Months   Lab Units 04/20/22  0945   LDH U/L 227*   REFERENCE LAB REPORT  See Attached Report  See Attached Report       Lab Results - Last 18 Months   Lab Units 06/09/22  0956 04/20/22  0945   IRON mcg/dL 78 73   TIBC mcg/dL 426 393   IRON SATURATION % 18* 19*   FERRITIN ng/mL 143.90 171.20   FOLATE ng/mL  --  12.40         PAST MEDICAL  HISTORY:  ALLERGIES:  No Known Allergies  CURRENT MEDICATIONS:  Outpatient Encounter Medications as of 6/9/2022   Medication Sig Dispense Refill   • aspirin 81 MG EC tablet Take 81 mg by mouth Daily.     • ferrous gluconate (FERGON) 324 MG tablet Take 1 tablet by mouth Daily With Breakfast. 30 tablet 2   • ibuprofen (ADVIL,MOTRIN) 200 MG tablet Take 200 mg by mouth Every 6 (Six) Hours As Needed for Mild Pain .     • pravastatin (PRAVACHOL) 40 MG tablet pravastatin 40 mg tablet   TAKE 1 TABLET BY MOUTH EVERY EVENING NEEDS APPT FOR REFILL       No facility-administered encounter medications on file as of 6/9/2022.     ADULT ILLNESSES:  Patient Active Problem List   Diagnosis Code   • Hyperlipidemia E78.5     SURGERIES:  Past Surgical History:   Procedure Laterality Date   • ANKLE SURGERY      screws in ankles   • CARPAL TUNNEL RELEASE     • COLONOSCOPY  04/13/2022   • HERNIA REPAIR     • TOE AMPUTATION       HEALTH MAINTENANCE ITEMS:  Health Maintenance Due   Topic Date Due   • COLORECTAL CANCER SCREENING  Never done   • TDAP/TD VACCINES (1 - Tdap) Never done   • ZOSTER VACCINE (1 of 2) Never done   • LUNG CANCER SCREENING  Never done   • Pneumococcal Vaccine 65+ (2 - PPSV23 or PCV20) 10/19/2021   • COVID-19 Vaccine (2 - Moderna series) 12/01/2021   • HEPATITIS C SCREENING  Never done   • ANNUAL WELLNESS VISIT  Never done   • LIPID PANEL  Never done       <no information>  Last Completed Colonoscopy     This patient has no relevant Health Maintenance data.        Immunization History   Administered Date(s) Administered   • COVID-19 (MODERNA) 1st, 2nd, 3rd Dose Only 11/03/2021   • Flublok 18+yrs 10/19/2020   • Fluzone Split Quad (Multi-dose) 10/11/2018   • Influenza, Unspecified 11/06/2016, 10/11/2018   • Pneumococcal Conjugate 13-Valent (PCV13) 10/19/2020     Last Completed Mammogram     This patient has no relevant Health Maintenance data.            FAMILY HISTORY:  History reviewed. No pertinent family  "history.  SOCIAL HISTORY:  Social History     Socioeconomic History   • Marital status:    Tobacco Use   • Smoking status: Current Every Day Smoker     Packs/day: 0.50     Years: 50.00     Pack years: 25.00     Types: Cigarettes   • Smokeless tobacco: Never Used   Vaping Use   • Vaping Use: Never used   Substance and Sexual Activity   • Alcohol use: Not Currently   • Drug use: Not Currently   • Sexual activity: Defer       REVIEW OF SYSTEMS:  Review of Systems   Constitutional: Negative for fatigue.   Respiratory: Negative for choking and shortness of breath.    Gastrointestinal: Negative for blood in stool, nausea and vomiting.   Genitourinary: Negative for hematuria.   Neurological: Negative for headache.   Hematological: Does not bruise/bleed easily.       /88   Pulse 92   Temp 97.2 °F (36.2 °C) (Temporal)   Resp 16   Ht 175.3 cm (69\")   Wt 82.1 kg (180 lb 14.4 oz)   SpO2 96%   BMI 26.71 kg/m²  Body surface area is 1.98 meters squared.    Pain Score    06/09/22 0959   PainSc: 0-No pain       Physical Exam:  Physical Exam  Constitutional:       Appearance: Normal appearance.   HENT:      Head: Normocephalic.   Eyes:      Extraocular Movements: Extraocular movements intact.   Cardiovascular:      Rate and Rhythm: Normal rate and regular rhythm.   Pulmonary:      Effort: Pulmonary effort is normal.      Breath sounds: Normal breath sounds.   Abdominal:      General: Bowel sounds are normal. There is no distension.      Tenderness: There is no abdominal tenderness.   Musculoskeletal:         General: Normal range of motion.   Skin:     General: Skin is warm and dry.   Neurological:      General: No focal deficit present.      Mental Status: He is alert and oriented to person, place, and time.   Psychiatric:         Mood and Affect: Mood normal.         Behavior: Behavior normal.         Austin Walton reports a pain score of 0.  No intervention indicated.         ASSESSMENT / PLAN      Advance care "     1. Tobacco use    2. Screening for lung cancer    3. Thrombocytosis, unspecified         ASSESSMENT:    1.  Iron saturation  slightly low at 19%  2.  Platelets are elevated at 563 which is likely reactive.  3.  LACY 2 V617F not detected  4.  MPL mutation not detected.    PLAN:  1.  Pt will take low dose iron daily intermittently   2.  Pt will continue all medications and treatment plans per PCP and any other providers.    3.  He is a smoker so we will order screening CT scan of chest  5.  He will RTC in 3 months.  6.  Patient will have preoffice labs. CBC, CMP Care discussed with patient.  Understanding expressed.  Patient agreeable with plan.        Advance Care planning  Discussed Advanced Care Planning with the patient and his wife.  The were both agreeable to the conversation.  They state they are aware that they need to complete an Advance Directive.  They were given a copy of the document.  We read through the document together.  I explained each section.  We discussed appointing a health surrogate.  Oh states he would like for his wife,Ashleigh Higgins, to be his surrogate.  Wife explains to patient that he doesn't have to appoint her.  Pt voices understanding.  He would like to discuss his decisions with is family.  Advised them that once completed, the document should be signed in front of a notary.  This discussed was approx 17 minutes.         I spent 20 minutes caring for Austin on this date of service. This time includes time spent by me in the following activities: preparing for the visit, reviewing tests, performing a medically appropriate examination and/or evaluation, counseling and educating the patient/family/caregiver, ordering medications, tests, or procedures and documenting information in the medical record.

## 2022-09-15 ENCOUNTER — APPOINTMENT (OUTPATIENT)
Dept: LAB | Facility: HOSPITAL | Age: 67
End: 2022-09-15

## 2023-05-02 ENCOUNTER — HOSPITAL ENCOUNTER (OUTPATIENT)
Dept: CT IMAGING | Age: 68
Discharge: HOME | End: 2023-05-02
Payer: MEDICARE

## 2023-05-02 DIAGNOSIS — Z12.2 ENCOUNTER FOR SCREENING FOR MALIGNANT NEOPLASM OF LUNG IN PATIENT WITH LESS THAN 30 PACK YEAR SMOKING HISTORY: ICD-10-CM

## 2023-05-02 DIAGNOSIS — Z87.891 ENCOUNTER FOR SCREENING FOR MALIGNANT NEOPLASM OF LUNG IN PATIENT WITH LESS THAN 30 PACK YEAR SMOKING HISTORY: ICD-10-CM

## 2023-05-02 PROCEDURE — 71271 CT THORAX LUNG CANCER SCR C-: CPT | Performed by: RADIOLOGY

## 2023-05-02 PROCEDURE — 71271 CT THORAX LUNG CANCER SCR C-: CPT

## 2023-05-03 VITALS — WEIGHT: 168 LBS | HEIGHT: 69 IN | BODY MASS INDEX: 24.88 KG/M2

## 2023-05-09 ENCOUNTER — LAB (OUTPATIENT)
Dept: LAB | Facility: HOSPITAL | Age: 68
End: 2023-05-09
Payer: MEDICARE

## 2023-05-09 ENCOUNTER — TELEPHONE (OUTPATIENT)
Dept: ONCOLOGY | Facility: CLINIC | Age: 68
End: 2023-05-09

## 2023-05-09 ENCOUNTER — OFFICE VISIT (OUTPATIENT)
Dept: ONCOLOGY | Facility: CLINIC | Age: 68
End: 2023-05-09
Payer: MEDICARE

## 2023-05-09 VITALS
WEIGHT: 182.9 LBS | HEIGHT: 69 IN | SYSTOLIC BLOOD PRESSURE: 142 MMHG | DIASTOLIC BLOOD PRESSURE: 88 MMHG | OXYGEN SATURATION: 93 % | RESPIRATION RATE: 16 BRPM | HEART RATE: 78 BPM | TEMPERATURE: 97.6 F | BODY MASS INDEX: 27.09 KG/M2

## 2023-05-09 DIAGNOSIS — E61.1 IRON DEFICIENCY: ICD-10-CM

## 2023-05-09 DIAGNOSIS — D47.3 ESSENTIAL THROMBOCYTOSIS: Primary | ICD-10-CM

## 2023-05-09 DIAGNOSIS — D75.839 THROMBOCYTOSIS, UNSPECIFIED: ICD-10-CM

## 2023-05-09 DIAGNOSIS — D75.839 THROMBOCYTOSIS, UNSPECIFIED: Primary | ICD-10-CM

## 2023-05-09 DIAGNOSIS — Z72.0 TOBACCO USE: ICD-10-CM

## 2023-05-09 LAB
ALBUMIN SERPL-MCNC: 4.6 G/DL (ref 3.5–5.2)
ALBUMIN/GLOB SERPL: 1.6 G/DL
ALP SERPL-CCNC: 77 U/L (ref 39–117)
ALT SERPL W P-5'-P-CCNC: 19 U/L (ref 1–41)
ANION GAP SERPL CALCULATED.3IONS-SCNC: 11 MMOL/L (ref 5–15)
AST SERPL-CCNC: 16 U/L (ref 1–40)
BASOPHILS # BLD AUTO: 0.03 10*3/MM3 (ref 0–0.2)
BASOPHILS NFR BLD AUTO: 0.4 % (ref 0–1.5)
BILIRUB SERPL-MCNC: 0.4 MG/DL (ref 0–1.2)
BUN SERPL-MCNC: 27 MG/DL (ref 8–23)
BUN/CREAT SERPL: 33.8 (ref 7–25)
CALCIUM SPEC-SCNC: 9.1 MG/DL (ref 8.6–10.5)
CHLORIDE SERPL-SCNC: 105 MMOL/L (ref 98–107)
CO2 SERPL-SCNC: 22 MMOL/L (ref 22–29)
CREAT SERPL-MCNC: 0.8 MG/DL (ref 0.76–1.27)
DEPRECATED RDW RBC AUTO: 44.7 FL (ref 37–54)
EGFRCR SERPLBLD CKD-EPI 2021: 97 ML/MIN/1.73
EOSINOPHIL # BLD AUTO: 0.13 10*3/MM3 (ref 0–0.4)
EOSINOPHIL NFR BLD AUTO: 1.8 % (ref 0.3–6.2)
ERYTHROCYTE [DISTWIDTH] IN BLOOD BY AUTOMATED COUNT: 13.1 % (ref 12.3–15.4)
FERRITIN SERPL-MCNC: 134.7 NG/ML (ref 30–400)
GLOBULIN UR ELPH-MCNC: 2.9 GM/DL
GLUCOSE SERPL-MCNC: 117 MG/DL (ref 65–99)
HCT VFR BLD AUTO: 44.3 % (ref 37.5–51)
HGB BLD-MCNC: 13.9 G/DL (ref 13–17.7)
HOLD SPECIMEN: NORMAL
IMM GRANULOCYTES # BLD AUTO: 0.03 10*3/MM3 (ref 0–0.05)
IMM GRANULOCYTES NFR BLD AUTO: 0.4 % (ref 0–0.5)
IRON 24H UR-MRATE: 52 MCG/DL (ref 59–158)
IRON SATN MFR SERPL: 13 % (ref 20–50)
LYMPHOCYTES # BLD AUTO: 2.44 10*3/MM3 (ref 0.7–3.1)
LYMPHOCYTES NFR BLD AUTO: 32.9 % (ref 19.6–45.3)
MCH RBC QN AUTO: 29.1 PG (ref 26.6–33)
MCHC RBC AUTO-ENTMCNC: 31.4 G/DL (ref 31.5–35.7)
MCV RBC AUTO: 92.9 FL (ref 79–97)
MONOCYTES # BLD AUTO: 0.66 10*3/MM3 (ref 0.1–0.9)
MONOCYTES NFR BLD AUTO: 8.9 % (ref 5–12)
NEUTROPHILS NFR BLD AUTO: 4.13 10*3/MM3 (ref 1.7–7)
NEUTROPHILS NFR BLD AUTO: 55.6 % (ref 42.7–76)
NRBC BLD AUTO-RTO: 0 /100 WBC (ref 0–0.2)
PLATELET # BLD AUTO: 612 10*3/MM3 (ref 140–450)
PMV BLD AUTO: 8.5 FL (ref 6–12)
POTASSIUM SERPL-SCNC: 4.6 MMOL/L (ref 3.5–5.2)
PROT SERPL-MCNC: 7.5 G/DL (ref 6–8.5)
RBC # BLD AUTO: 4.77 10*6/MM3 (ref 4.14–5.8)
SODIUM SERPL-SCNC: 138 MMOL/L (ref 136–145)
TIBC SERPL-MCNC: 389 MCG/DL (ref 298–536)
TRANSFERRIN SERPL-MCNC: 261 MG/DL (ref 200–360)
WBC NRBC COR # BLD: 7.42 10*3/MM3 (ref 3.4–10.8)

## 2023-05-09 PROCEDURE — 80053 COMPREHEN METABOLIC PANEL: CPT

## 2023-05-09 PROCEDURE — 82728 ASSAY OF FERRITIN: CPT

## 2023-05-09 PROCEDURE — 83540 ASSAY OF IRON: CPT

## 2023-05-09 PROCEDURE — 36415 COLL VENOUS BLD VENIPUNCTURE: CPT

## 2023-05-09 PROCEDURE — 85025 COMPLETE CBC W/AUTO DIFF WBC: CPT

## 2023-05-09 PROCEDURE — 84466 ASSAY OF TRANSFERRIN: CPT

## 2023-05-09 RX ORDER — HYDROXYUREA 500 MG/1
500 CAPSULE ORAL DAILY
Qty: 30 CAPSULE | Refills: 3 | Status: SHIPPED | OUTPATIENT
Start: 2023-05-09

## 2023-05-09 RX ORDER — ROSUVASTATIN CALCIUM 40 MG/1
1 TABLET, COATED ORAL DAILY
COMMUNITY
Start: 2023-03-21

## 2023-05-09 RX ORDER — LISINOPRIL 20 MG/1
1 TABLET ORAL DAILY
COMMUNITY
Start: 2023-03-21

## 2023-05-09 NOTE — PROGRESS NOTES
[facility] GROUP HEMATOLOGY & ONCOLOGY  [address]  [place] [address] [phone]    Patient Name: Austin Walton  Encounter Date: 06/09/2022  YOB: 1955  Patient Number: [id number]    Hematology / Oncology Progress Note    HPI / REASON FOR VISIT: Austin Walton is a 67 y.o. male who is followed by this office for elevated platelets. .  LACY 2 V617F and MPL mutation not detected.     He had colonscopy 4/14 at [facility], Dr. Chaudhari. Wife reports it was unremarkable other than slightly enlarged prostate.      He has smoked for over 50 years and smokes approx 1 PPD or a little less.   He does report some chronic fatigue but has no acute complaint.    INTERVAL HISTORY   Pt presents to clinic today for continued follow up.  He states since his last visit he experienced some chest pain and went to ER and work up was negative.      He had labs drawn today and results were reviewed with him and his wife.     LABS    Lab Results - Last 18 Months   Lab Units 05/09/23  1009 06/09/22  0956 04/20/22  0945   HEMOGLOBIN g/dL 13.9 14.9 13.6   HEMATOCRIT % 44.3 45.0 41.2   MCV fL 92.9 91.8 92.6   WBC 10*3/mm3 7.42 8.47 6.38   RDW % 13.1 13.2 13.7   MPV fL 8.5 8.2 8.4   PLATELETS 10*3/mm3 612* 578* 563*   IMM GRAN % % 0.4 0.4 0.5   NEUTROS ABS 10*3/mm3 4.13 4.84 3.74   LYMPHS ABS 10*3/mm3 2.44 2.68 1.89   MONOS ABS 10*3/mm3 0.66 0.77 0.55   EOS ABS 10*3/mm3 0.13 0.11 0.14   BASOS ABS 10*3/mm3 0.03 0.04 0.03   IMMATURE GRANS (ABS) 10*3/mm3 0.03 0.03 0.03   NRBC /100 WBC 0.0 0.0 0.0       Lab Results - Last 18 Months   Lab Units 05/09/23  1009 06/09/22  0956 04/20/22  0945   GLUCOSE mg/dL 117* 107* 113*   SODIUM mmol/L 138 136 138   POTASSIUM mmol/L 4.6 4.2 4.7   CO2 mmol/L 22.0 26.0 26.0   CHLORIDE mmol/L 105 101 103   ANION GAP mmol/L 11.0 9.0 9.0   CREATININE mg/dL 0.80 0.81 0.75*   BUN mg/dL 27* 19 18   BUN / CREAT RATIO  33.8* 23.5 24.0    CALCIUM mg/dL 9.1 9.8 9.5   ALK PHOS U/L 77 95 82   TOTAL PROTEIN g/dL 7.5 7.6 7.3   ALT (SGPT) U/L 19 14 14   AST (SGOT) U/L 16 14 16   BILIRUBIN mg/dL 0.4 0.4 0.4   ALBUMIN g/dL 4.6 4.50 4.50   GLOBULIN gm/dL 2.9 3.1 2.8       Lab Results - Last 18 Months   Lab Units 04/20/22  0945   LDH U/L 227*   REFERENCE LAB REPORT  See Attached Report  See Attached Report       Lab Results - Last 18 Months   Lab Units 05/09/23  1009 06/09/22  0956 04/20/22  0945   IRON mcg/dL 52* 78 73   TIBC mcg/dL 389 426 393   IRON SATURATION % 13* 18* 19*   FERRITIN ng/mL 134.70 143.90 171.20   FOLATE ng/mL  --   --  12.40         PAST MEDICAL HISTORY:  ALLERGIES:  No Known Allergies  CURRENT MEDICATIONS:  Outpatient Encounter Medications as of 5/9/2023   Medication Sig Dispense Refill    aspirin 81 MG EC tablet Take 1 tablet by mouth Daily.      ibuprofen (ADVIL,MOTRIN) 200 MG tablet Take 1 tablet by mouth Every 6 (Six) Hours As Needed for Mild Pain.      lisinopril (PRINIVIL,ZESTRIL) 20 MG tablet Take 1 tablet by mouth Daily.      rosuvastatin (CRESTOR) 40 MG tablet Take 1 tablet by mouth Daily.      hydroxyurea (Hydrea) 500 MG capsule Take 1 capsule by mouth Daily. 30 capsule 3    [DISCONTINUED] ferrous gluconate (FERGON) 324 MG tablet Take 1 tablet by mouth Daily With Breakfast. (Patient not taking: Reported on 5/9/2023) 30 tablet 2    [DISCONTINUED] pravastatin (PRAVACHOL) 40 MG tablet pravastatin 40 mg tablet   TAKE 1 TABLET BY MOUTH EVERY EVENING NEEDS APPT FOR REFILL (Patient not taking: Reported on 5/9/2023)       No facility-administered encounter medications on file as of 5/9/2023.     ADULT ILLNESSES:  Patient Active Problem List   Diagnosis Code    Hyperlipidemia E78.5     SURGERIES:  Past Surgical History:   Procedure Laterality Date    ANKLE SURGERY      screws in ankles    CARPAL TUNNEL RELEASE      COLONOSCOPY  04/13/2022    HERNIA REPAIR      TOE AMPUTATION       HEALTH MAINTENANCE ITEMS:  Health Maintenance Due  "  Topic Date Due    COLORECTAL CANCER SCREENING  Never done    TDAP/TD VACCINES (1 - Tdap) Never done    ZOSTER VACCINE (1 of 2) Never done    LUNG CANCER SCREENING  Never done    Pneumococcal Vaccine 65+ (2 - PPSV23 if available, else PCV20) 10/19/2021    COVID-19 Vaccine (2 - Booster for Moderna series) 12/29/2021    HEPATITIS C SCREENING  Never done    ANNUAL WELLNESS VISIT  Never done    LIPID PANEL  Never done       <no information>  Last Completed Colonoscopy       This patient has no relevant Health Maintenance data.          Immunization History   Administered Date(s) Administered    COVID-19 (MODERNA) 1st,2nd,3rd Dose Monovalent 11/03/2021    Flublok 18+yrs 10/19/2020    Fluzone Quad >6mos (Multi-dose) 10/11/2018    Influenza, Unspecified 11/06/2016, 10/11/2018    Pneumococcal Conjugate 13-Valent (PCV13) 10/19/2020     Last Completed Mammogram       This patient has no relevant Health Maintenance data.              FAMILY HISTORY:  History reviewed. No pertinent family history.  SOCIAL HISTORY:  Social History     Socioeconomic History    Marital status:    Tobacco Use    Smoking status: Every Day     Packs/day: 0.50     Years: 50.00     Pack years: 25.00     Types: Cigarettes    Smokeless tobacco: Never   Vaping Use    Vaping Use: Never used   Substance and Sexual Activity    Alcohol use: Not Currently    Drug use: Not Currently    Sexual activity: Defer       REVIEW OF SYSTEMS:  Review of Systems   Constitutional:  Negative for fatigue.   Respiratory:  Negative for choking and shortness of breath.    Gastrointestinal:  Negative for blood in stool, nausea and vomiting.   Genitourinary:  Negative for hematuria.   Neurological:  Negative for headache.   Hematological:  Does not bruise/bleed easily.     /88   Pulse 78   Temp 97.6 °F (36.4 °C)   Resp 16   Ht 175.3 cm (69\")   Wt 83 kg (182 lb 14.4 oz)   SpO2 93%   BMI 27.01 kg/m²  Body surface area is 1.99 meters squared.    Pain Score    " 05/09/23 1033   PainSc: 0-No pain       Physical Exam:  Physical Exam  Constitutional:       Appearance: Normal appearance.   HENT:      Head: Normocephalic.   Eyes:      Extraocular Movements: Extraocular movements intact.   Cardiovascular:      Rate and Rhythm: Normal rate and regular rhythm.   Pulmonary:      Effort: Pulmonary effort is normal.      Breath sounds: Normal breath sounds.   Abdominal:      General: Bowel sounds are normal. There is no distension.      Tenderness: There is no abdominal tenderness.   Musculoskeletal:         General: Normal range of motion.   Skin:     General: Skin is warm and dry.   Neurological:      General: No focal deficit present.      Mental Status: He is alert and oriented to person, place, and time.   Psychiatric:         Mood and Affect: Mood normal.         Behavior: Behavior normal.       Austin Walotn reports a pain score of 0.  No intervention indicated.         ASSESSMENT / PLAN      1. Essential thrombocytosis    2. Thrombocytosis, unspecified    3. Iron deficiency    4. Tobacco use         1.  Essential Thrombocytosis   -03/14/23 Platelets 622   -Labs today 612  Pt is LACY 2 and MPL negative.  Platelets have been greater than 600 since March  As he is 67 years old with increased cardiovascular risk with hyperlipidemia, hypertension, Will start on Hydrea 500 mg PO daily for LACY 2 negative ET.   -Will also send [employer] Myeloid to rule out any less common mutations.     2.  Iron Deficiency   -Iron has been normal but today iron 52, Ferritin 134, Sat 13% TIBC 389  -Pt will start oral iron once daily     3.  Tobacco Use   -Currently smoking 1/2 PPD x 50 years  -Advised smoking cessation    PLAN:  1.  Pt will take oral iron daily intermittently   2.  Pt will continue all medications and treatment plans per PCP and any other providers.    3.  He will RTC in 1 months   4.  Patient will have preoffice labs. CBC, CMP, Iron profile, ferritin   5.  Care discussed with patient.   Understanding expressed.  Patient agreeable with plan.      Berenice Burger, APRN  05/09/2023

## 2023-05-25 LAB — REF LAB TEST METHOD: NORMAL

## 2023-05-30 DIAGNOSIS — D50.9 IRON DEFICIENCY ANEMIA, UNSPECIFIED IRON DEFICIENCY ANEMIA TYPE: Primary | ICD-10-CM

## 2023-06-06 ENCOUNTER — OFFICE VISIT (OUTPATIENT)
Dept: ONCOLOGY | Facility: CLINIC | Age: 68
End: 2023-06-06
Payer: MEDICARE

## 2023-06-06 ENCOUNTER — LAB (OUTPATIENT)
Dept: LAB | Facility: HOSPITAL | Age: 68
End: 2023-06-06
Payer: MEDICARE

## 2023-06-06 VITALS
HEIGHT: 69 IN | TEMPERATURE: 97.6 F | OXYGEN SATURATION: 94 % | HEART RATE: 65 BPM | RESPIRATION RATE: 16 BRPM | BODY MASS INDEX: 26.22 KG/M2 | WEIGHT: 177 LBS | DIASTOLIC BLOOD PRESSURE: 88 MMHG | SYSTOLIC BLOOD PRESSURE: 138 MMHG

## 2023-06-06 DIAGNOSIS — Z15.09 MUTATION IN TP53 GENE: ICD-10-CM

## 2023-06-06 DIAGNOSIS — Z72.0 TOBACCO USE: ICD-10-CM

## 2023-06-06 DIAGNOSIS — D50.9 IRON DEFICIENCY ANEMIA, UNSPECIFIED IRON DEFICIENCY ANEMIA TYPE: ICD-10-CM

## 2023-06-06 DIAGNOSIS — Z15.02 MUTATION IN TP53 GENE: ICD-10-CM

## 2023-06-06 DIAGNOSIS — D47.3 ESSENTIAL THROMBOCYTOSIS: Primary | ICD-10-CM

## 2023-06-06 DIAGNOSIS — Z15.01 MUTATION IN TP53 GENE: ICD-10-CM

## 2023-06-06 DIAGNOSIS — D75.839 THROMBOCYTOSIS, UNSPECIFIED: Primary | ICD-10-CM

## 2023-06-06 LAB
ALBUMIN SERPL-MCNC: 4.2 G/DL (ref 3.5–5.2)
ALBUMIN/GLOB SERPL: 1.3 G/DL
ALP SERPL-CCNC: 69 U/L (ref 39–117)
ALT SERPL W P-5'-P-CCNC: 14 U/L (ref 1–41)
ANION GAP SERPL CALCULATED.3IONS-SCNC: 11 MMOL/L (ref 5–15)
AST SERPL-CCNC: 15 U/L (ref 1–40)
BASOPHILS # BLD AUTO: 0.05 10*3/MM3 (ref 0–0.2)
BASOPHILS NFR BLD AUTO: 0.9 % (ref 0–1.5)
BILIRUB SERPL-MCNC: 0.8 MG/DL (ref 0–1.2)
BUN SERPL-MCNC: 15 MG/DL (ref 8–23)
BUN/CREAT SERPL: 18.5 (ref 7–25)
CALCIUM SPEC-SCNC: 9.3 MG/DL (ref 8.6–10.5)
CHLORIDE SERPL-SCNC: 103 MMOL/L (ref 98–107)
CO2 SERPL-SCNC: 23 MMOL/L (ref 22–29)
CREAT SERPL-MCNC: 0.81 MG/DL (ref 0.76–1.27)
DEPRECATED RDW RBC AUTO: 46.9 FL (ref 37–54)
EGFRCR SERPLBLD CKD-EPI 2021: 96 ML/MIN/1.73
EOSINOPHIL # BLD AUTO: 0.09 10*3/MM3 (ref 0–0.4)
EOSINOPHIL NFR BLD AUTO: 1.6 % (ref 0.3–6.2)
ERYTHROCYTE [DISTWIDTH] IN BLOOD BY AUTOMATED COUNT: 14.6 % (ref 12.3–15.4)
FERRITIN SERPL-MCNC: 172.3 NG/ML (ref 30–400)
GLOBULIN UR ELPH-MCNC: 3.3 GM/DL
GLUCOSE SERPL-MCNC: 94 MG/DL (ref 65–99)
HCT VFR BLD AUTO: 40.5 % (ref 37.5–51)
HGB BLD-MCNC: 13 G/DL (ref 13–17.7)
HOLD SPECIMEN: NORMAL
HOLD SPECIMEN: NORMAL
IMM GRANULOCYTES # BLD AUTO: 0.02 10*3/MM3 (ref 0–0.05)
IMM GRANULOCYTES NFR BLD AUTO: 0.4 % (ref 0–0.5)
IRON 24H UR-MRATE: 76 MCG/DL (ref 59–158)
IRON SATN MFR SERPL: 22 % (ref 20–50)
LYMPHOCYTES # BLD AUTO: 2.03 10*3/MM3 (ref 0.7–3.1)
LYMPHOCYTES NFR BLD AUTO: 36.4 % (ref 19.6–45.3)
MCH RBC QN AUTO: 29.4 PG (ref 26.6–33)
MCHC RBC AUTO-ENTMCNC: 32.1 G/DL (ref 31.5–35.7)
MCV RBC AUTO: 91.6 FL (ref 79–97)
MONOCYTES # BLD AUTO: 0.54 10*3/MM3 (ref 0.1–0.9)
MONOCYTES NFR BLD AUTO: 9.7 % (ref 5–12)
NEUTROPHILS NFR BLD AUTO: 2.85 10*3/MM3 (ref 1.7–7)
NEUTROPHILS NFR BLD AUTO: 51 % (ref 42.7–76)
NRBC BLD AUTO-RTO: 0 /100 WBC (ref 0–0.2)
PLATELET # BLD AUTO: 514 10*3/MM3 (ref 140–450)
PMV BLD AUTO: 8 FL (ref 6–12)
POTASSIUM SERPL-SCNC: 4.3 MMOL/L (ref 3.5–5.2)
PROT SERPL-MCNC: 7.5 G/DL (ref 6–8.5)
RBC # BLD AUTO: 4.42 10*6/MM3 (ref 4.14–5.8)
SODIUM SERPL-SCNC: 137 MMOL/L (ref 136–145)
TIBC SERPL-MCNC: 346 MCG/DL (ref 298–536)
TRANSFERRIN SERPL-MCNC: 232 MG/DL (ref 200–360)
WBC NRBC COR # BLD: 5.58 10*3/MM3 (ref 3.4–10.8)

## 2023-06-06 PROCEDURE — 82728 ASSAY OF FERRITIN: CPT

## 2023-06-06 PROCEDURE — 36415 COLL VENOUS BLD VENIPUNCTURE: CPT

## 2023-06-06 PROCEDURE — 83540 ASSAY OF IRON: CPT

## 2023-06-06 PROCEDURE — 85025 COMPLETE CBC W/AUTO DIFF WBC: CPT

## 2023-06-06 PROCEDURE — 84466 ASSAY OF TRANSFERRIN: CPT

## 2023-06-06 PROCEDURE — 80053 COMPREHEN METABOLIC PANEL: CPT

## 2023-06-06 RX ORDER — FERROUS SULFATE 325(65) MG
325 TABLET ORAL
Qty: 90 TABLET | Refills: 2 | Status: SHIPPED | OUTPATIENT
Start: 2023-06-06

## 2023-06-06 NOTE — PATIENT INSTRUCTIONS
He is taking oral iron once daily and Hydrea (for elevated platelets) once daily.    Labs are very good today.     Hgb 13.0.    Iron is normal and platelets are down to 514 from 612.    Continue everything like he has been.     He has orders to get labs drawn in [place] on / around July 18.     He will return to clinic in 3 months.    Please call anytime with any problems or questions.      Berenice

## 2023-06-06 NOTE — PROGRESS NOTES
[facility] GROUP HEMATOLOGY & ONCOLOGY  [address]  [place] [address] [phone]    Patient Name: Austin Walton  Encounter Date: 06/09/2022  YOB: 1955  Patient Number: [id number]    Hematology / Oncology Progress Note    HPI / REASON FOR VISIT: Austin Walton is a 68 y.o. 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     He had colonscopy 4/14 at [facility], Dr. Chaudhari. Wife reports it was unremarkable other than slightly enlarged prostate.      He has smoked for over 50 years and smokes approx 1 PPD or a little less.   He does report some chronic fatigue but has no acute complaint.    Pt has had platelets consistently above 500.  Since March 2023, they have been above 600.  Iron was corrected and platelets remained elevated.    (3/14/23:  622, 05/09/23:  612).  As pt is >60  years old with increased cardiovascular risk with hyperlipidemia, hypertension, he was started on Hydrea 500 mg PO daily May 9, 2023.     INTERVAL HISTORY   Pt presents to clinic today for continued follow up.  Takng oral iron once daily as well as Hydrea once daily.  He is tolerating both well.      Pt had IntelliGEN Myeloid panel which was positive for TP53.   He had labs drawn today and results were reviewed with him in office.        LABS    Lab Results - Last 18 Months   Lab Units 06/06/23  1245 05/09/23  1009 06/09/22  0956 04/20/22  0945   HEMOGLOBIN g/dL 13.0 13.9 14.9 13.6   HEMATOCRIT % 40.5 44.3 45.0 41.2   MCV fL 91.6 92.9 91.8 92.6   WBC 10*3/mm3 5.58 7.42 8.47 6.38   RDW % 14.6 13.1 13.2 13.7   MPV fL 8.0 8.5 8.2 8.4   PLATELETS 10*3/mm3 514* 612* 578* 563*   IMM GRAN % % 0.4 0.4 0.4 0.5   NEUTROS ABS 10*3/mm3 2.85 4.13 4.84 3.74   LYMPHS ABS 10*3/mm3 2.03 2.44 2.68 1.89   MONOS ABS 10*3/mm3 0.54 0.66 0.77 0.55   EOS ABS 10*3/mm3 0.09 0.13 0.11 0.14   BASOS ABS 10*3/mm3 0.05  0.03 0.04 0.03   IMMATURE GRANS (ABS) 10*3/mm3 0.02 0.03 0.03 0.03   NRBC /100 WBC 0.0 0.0 0.0 0.0       Lab Results - Last 18 Months   Lab Units 06/06/23  1245 05/09/23  1009 06/09/22  0956 04/20/22  0945   GLUCOSE mg/dL 94 117* 107* 113*   SODIUM mmol/L 137 138 136 138   POTASSIUM mmol/L 4.3 4.6 4.2 4.7   CO2 mmol/L 23.0 22.0 26.0 26.0   CHLORIDE mmol/L 103 105 101 103   ANION GAP mmol/L 11.0 11.0 9.0 9.0   CREATININE mg/dL 0.81 0.80 0.81 0.75*   BUN mg/dL 15 27* 19 18   BUN / CREAT RATIO  18.5 33.8* 23.5 24.0   CALCIUM mg/dL 9.3 9.1 9.8 9.5   ALK PHOS U/L 69 77 95 82   TOTAL PROTEIN g/dL 7.5 7.5 7.6 7.3   ALT (SGPT) U/L 14 19 14 14   AST (SGOT) U/L 15 16 14 16   BILIRUBIN mg/dL 0.8 0.4 0.4 0.4   ALBUMIN g/dL 4.2 4.6 4.50 4.50   GLOBULIN gm/dL 3.3 2.9 3.1 2.8       Lab Results - Last 18 Months   Lab Units 05/09/23  1140 04/20/22  0945   LDH U/L  --  227*   REFERENCE LAB REPORT  See Attached Report See Attached Report  See Attached Report       Lab Results - Last 18 Months   Lab Units 06/06/23  1245 05/09/23  1009 06/09/22  0956 04/20/22  0945   IRON mcg/dL 76 52* 78 73   TIBC mcg/dL 346 389 426 393   IRON SATURATION (TSAT) % 22 13* 18* 19*   FERRITIN ng/mL 172.30 134.70 143.90 171.20   FOLATE ng/mL  --   --   --  12.40         PAST MEDICAL HISTORY:  ALLERGIES:  No Known Allergies  CURRENT MEDICATIONS:  Outpatient Encounter Medications as of 6/6/2023   Medication Sig Dispense Refill    aspirin 81 MG EC tablet Take 1 tablet by mouth Daily.      hydroxyurea (Hydrea) 500 MG capsule Take 1 capsule by mouth Daily. 30 capsule 3    ibuprofen (ADVIL,MOTRIN) 200 MG tablet Take 1 tablet by mouth Every 6 (Six) Hours As Needed for Mild Pain.      lisinopril (PRINIVIL,ZESTRIL) 20 MG tablet Take 1 tablet by mouth Daily.      rosuvastatin (CRESTOR) 40 MG tablet Take 1 tablet by mouth Daily.      ferrous sulfate 325 (65 FE) MG tablet Take 1 tablet by mouth Daily With Breakfast. 90 tablet 2     No facility-administered encounter  medications on file as of 6/6/2023.     ADULT ILLNESSES:  Patient Active Problem List   Diagnosis Code    Hyperlipidemia E78.5     SURGERIES:  Past Surgical History:   Procedure Laterality Date    ANKLE SURGERY      screws in ankles    CARPAL TUNNEL RELEASE      COLONOSCOPY  04/13/2022    HERNIA REPAIR      TOE AMPUTATION       HEALTH MAINTENANCE ITEMS:  Health Maintenance Due   Topic Date Due    COLORECTAL CANCER SCREENING  Never done    TDAP/TD VACCINES (1 - Tdap) Never done    ZOSTER VACCINE (1 of 2) Never done    LUNG CANCER SCREENING  Never done    Pneumococcal Vaccine 65+ (2 - PPSV23 if available, else PCV20) 10/19/2021    COVID-19 Vaccine (2 - Moderna series) 12/29/2021    HEPATITIS C SCREENING  Never done    ANNUAL WELLNESS VISIT  Never done    LIPID PANEL  Never done       <no information>  Last Completed Colonoscopy       This patient has no relevant Health Maintenance data.          Immunization History   Administered Date(s) Administered    COVID-19 (MODERNA) 1st,2nd,3rd Dose Monovalent 11/03/2021    Flublok 18+yrs 10/19/2020    Fluzone Quad >6mos (Multi-dose) 10/11/2018    Influenza, Unspecified 11/06/2016, 10/11/2018    Pneumococcal Conjugate 13-Valent (PCV13) 10/19/2020     Last Completed Mammogram       This patient has no relevant Health Maintenance data.              FAMILY HISTORY:  History reviewed. No pertinent family history.  SOCIAL HISTORY:  Social History     Socioeconomic History    Marital status:    Tobacco Use    Smoking status: Every Day     Packs/day: 0.50     Years: 50.00     Pack years: 25.00     Types: Cigarettes    Smokeless tobacco: Never   Vaping Use    Vaping Use: Never used   Substance and Sexual Activity    Alcohol use: Not Currently    Drug use: Not Currently    Sexual activity: Defer       REVIEW OF SYSTEMS:  Review of Systems   Constitutional:  Negative for fatigue.   Respiratory:  Negative for choking and shortness of breath.    Gastrointestinal:  Negative for blood  "in stool, nausea and vomiting.   Genitourinary:  Negative for hematuria.   Neurological:  Negative for headache.   Hematological:  Does not bruise/bleed easily.     /88   Pulse 65   Temp 97.6 °F (36.4 °C)   Resp 16   Ht 175.3 cm (69\")   Wt 80.3 kg (177 lb)   SpO2 94%   BMI 26.14 kg/m²  Body surface area is 1.96 meters squared.    Pain Score    06/06/23 1311   PainSc: 0-No pain       Physical Exam:  Physical Exam  Constitutional:       Appearance: Normal appearance.   HENT:      Head: Normocephalic.   Eyes:      Extraocular Movements: Extraocular movements intact.   Cardiovascular:      Rate and Rhythm: Normal rate and regular rhythm.   Pulmonary:      Effort: Pulmonary effort is normal.      Breath sounds: Normal breath sounds.   Abdominal:      General: Bowel sounds are normal. There is no distension.      Tenderness: There is no abdominal tenderness.   Musculoskeletal:         General: Normal range of motion.   Skin:     General: Skin is warm and dry.   Neurological:      General: No focal deficit present.      Mental Status: He is alert and oriented to person, place, and time.   Psychiatric:         Mood and Affect: Mood normal.         Behavior: Behavior normal.       Austin Walton reports a pain score of 0.  No intervention indicated.         ASSESSMENT / PLAN      1. 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 Iron deficiency anemia, unspecified iron deficiency anemia type    4. Tobacco use         1.  Essential Thrombocytosis   -JAK2, MPL Negative   -As pt is >60  years old with increased cardiovascular risk with hyperlipidemia, hypertension, he was started on Hydrea 500 mg PO daily.   -03/14/23 Platelets 622, 5/9/23 Platelets 612  -Pt was started on Hydrea 500 mg PO daily   -Platelets today improved to 514  -Pt will continue current Hydrea dose.       2.  TP 53 Mutation  -IntelliGEN Myeloid POSITIVE for TP53 Mutation.  There is an increased chance to develop soft tissue sarcoma, " osteosarcoma, female breast cancer, brain tumors, adrenocortical carcinoma (ACC), leukemia, and potentially other types of cancer such as prostate cancer. The chance for cancer may be more than 90%.  -Attempted to contact patient.  No answer.  Message left. Will mail [employer] explanation of TP53 mutation.     2.  Iron Deficiency   -Pt is taking oral iron daily   -Labs today Hgb 13.0, Hct 40.5, Iron 76, Ferritin 172, Sat 22%, TIBC 346    3.  Tobacco Use   -Currently smoking 1/2 PPD x 50 years  -Advised smoking cessation    PLAN:  Stable for observation  Continue Hydrea 500 mg once daily   Continue oral iron once daily   Continue current medications, treatment plans and follow up with PCP and any other providers  Labs only in 6 weeks.  Orders were given to patient to have at local facility.  Return to office 3 months   Pre-office labs for CBC, CMP, Iron Profile Ferritin   Care discussed with patient.  Understanding expressed.  Patient agreeable with plan.    Berenice Burger, [place]  06/06/2023

## 2023-06-12 ENCOUNTER — TELEPHONE (OUTPATIENT)
Dept: ONCOLOGY | Facility: CLINIC | Age: 68
End: 2023-06-12
Payer: MEDICARE

## 2023-06-12 NOTE — TELEPHONE ENCOUNTER
Caller: Austin Walton    Relationship: Self    Best call back number: [phone]      What was the call regarding: PATIENT CALLED WANTED TO GET HIS LAB RESULTS, HE WILL NOT BE HOME TODAY AND PLEASE CALL ABOVE NUMBER INSTEAD

## 2023-08-30 DIAGNOSIS — D47.3 ESSENTIAL THROMBOCYTOSIS: ICD-10-CM

## 2023-08-30 RX ORDER — HYDROXYUREA 500 MG/1
500 CAPSULE ORAL DAILY
Qty: 30 CAPSULE | Refills: 3 | Status: SHIPPED | OUTPATIENT
Start: 2023-08-30

## 2023-09-06 ENCOUNTER — OFFICE VISIT (OUTPATIENT)
Dept: ONCOLOGY | Facility: CLINIC | Age: 68
End: 2023-09-06
Payer: MEDICARE

## 2023-09-06 ENCOUNTER — LAB (OUTPATIENT)
Dept: LAB | Facility: HOSPITAL | Age: 68
End: 2023-09-06
Payer: MEDICARE

## 2023-09-06 VITALS
BODY MASS INDEX: 24.72 KG/M2 | HEART RATE: 69 BPM | DIASTOLIC BLOOD PRESSURE: 86 MMHG | SYSTOLIC BLOOD PRESSURE: 148 MMHG | TEMPERATURE: 97.4 F | RESPIRATION RATE: 16 BRPM | OXYGEN SATURATION: 100 % | WEIGHT: 166.9 LBS | HEIGHT: 69 IN

## 2023-09-06 DIAGNOSIS — D47.3 ESSENTIAL THROMBOCYTOSIS: Primary | ICD-10-CM

## 2023-09-06 DIAGNOSIS — Z15.09 MUTATION IN TP53 GENE: ICD-10-CM

## 2023-09-06 DIAGNOSIS — Z15.01 MUTATION IN TP53 GENE: ICD-10-CM

## 2023-09-06 DIAGNOSIS — D50.9 IRON DEFICIENCY ANEMIA, UNSPECIFIED IRON DEFICIENCY ANEMIA TYPE: ICD-10-CM

## 2023-09-06 DIAGNOSIS — Z15.02 MUTATION IN TP53 GENE: ICD-10-CM

## 2023-09-06 LAB
ALBUMIN SERPL-MCNC: 4.5 G/DL (ref 3.5–5.2)
ALBUMIN/GLOB SERPL: 1.6 G/DL
ALP SERPL-CCNC: 70 U/L (ref 39–117)
ALT SERPL W P-5'-P-CCNC: 16 U/L (ref 1–41)
ANION GAP SERPL CALCULATED.3IONS-SCNC: 10 MMOL/L (ref 5–15)
AST SERPL-CCNC: 16 U/L (ref 1–40)
BASOPHILS # BLD AUTO: 0.03 10*3/MM3 (ref 0–0.2)
BASOPHILS NFR BLD AUTO: 0.4 % (ref 0–1.5)
BILIRUB SERPL-MCNC: 0.8 MG/DL (ref 0–1.2)
BUN SERPL-MCNC: 20 MG/DL (ref 8–23)
BUN/CREAT SERPL: 26.7 (ref 7–25)
CALCIUM SPEC-SCNC: 8.8 MG/DL (ref 8.6–10.5)
CHLORIDE SERPL-SCNC: 103 MMOL/L (ref 98–107)
CO2 SERPL-SCNC: 25 MMOL/L (ref 22–29)
CREAT SERPL-MCNC: 0.75 MG/DL (ref 0.76–1.27)
DEPRECATED RDW RBC AUTO: 61.5 FL (ref 37–54)
EGFRCR SERPLBLD CKD-EPI 2021: 98.3 ML/MIN/1.73
EOSINOPHIL # BLD AUTO: 0.09 10*3/MM3 (ref 0–0.4)
EOSINOPHIL NFR BLD AUTO: 1.2 % (ref 0.3–6.2)
ERYTHROCYTE [DISTWIDTH] IN BLOOD BY AUTOMATED COUNT: 16.3 % (ref 12.3–15.4)
FERRITIN SERPL-MCNC: 224.6 NG/ML (ref 30–400)
GLOBULIN UR ELPH-MCNC: 2.9 GM/DL
GLUCOSE SERPL-MCNC: 95 MG/DL (ref 65–99)
HCT VFR BLD AUTO: 39.8 % (ref 37.5–51)
HGB BLD-MCNC: 13.1 G/DL (ref 13–17.7)
HOLD SPECIMEN: NORMAL
IMM GRANULOCYTES # BLD AUTO: 0.01 10*3/MM3 (ref 0–0.05)
IMM GRANULOCYTES NFR BLD AUTO: 0.1 % (ref 0–0.5)
IRON 24H UR-MRATE: 91 MCG/DL (ref 59–158)
IRON SATN MFR SERPL: 24 % (ref 20–50)
LYMPHOCYTES # BLD AUTO: 2.11 10*3/MM3 (ref 0.7–3.1)
LYMPHOCYTES NFR BLD AUTO: 27.8 % (ref 19.6–45.3)
MCH RBC QN AUTO: 33.9 PG (ref 26.6–33)
MCHC RBC AUTO-ENTMCNC: 32.9 G/DL (ref 31.5–35.7)
MCV RBC AUTO: 103.1 FL (ref 79–97)
MONOCYTES # BLD AUTO: 0.6 10*3/MM3 (ref 0.1–0.9)
MONOCYTES NFR BLD AUTO: 7.9 % (ref 5–12)
NEUTROPHILS NFR BLD AUTO: 4.75 10*3/MM3 (ref 1.7–7)
NEUTROPHILS NFR BLD AUTO: 62.6 % (ref 42.7–76)
NRBC BLD AUTO-RTO: 0 /100 WBC (ref 0–0.2)
PLATELET # BLD AUTO: 535 10*3/MM3 (ref 140–450)
PMV BLD AUTO: 8.3 FL (ref 6–12)
POTASSIUM SERPL-SCNC: 4.8 MMOL/L (ref 3.5–5.2)
PROT SERPL-MCNC: 7.4 G/DL (ref 6–8.5)
RBC # BLD AUTO: 3.86 10*6/MM3 (ref 4.14–5.8)
SODIUM SERPL-SCNC: 138 MMOL/L (ref 136–145)
TIBC SERPL-MCNC: 377 MCG/DL (ref 298–536)
TRANSFERRIN SERPL-MCNC: 253 MG/DL (ref 200–360)
WBC NRBC COR # BLD: 7.59 10*3/MM3 (ref 3.4–10.8)

## 2023-09-06 PROCEDURE — 80053 COMPREHEN METABOLIC PANEL: CPT

## 2023-09-06 PROCEDURE — 82728 ASSAY OF FERRITIN: CPT

## 2023-09-06 PROCEDURE — 83540 ASSAY OF IRON: CPT

## 2023-09-06 PROCEDURE — 36415 COLL VENOUS BLD VENIPUNCTURE: CPT

## 2023-09-06 PROCEDURE — 85025 COMPLETE CBC W/AUTO DIFF WBC: CPT

## 2023-09-06 PROCEDURE — 84466 ASSAY OF TRANSFERRIN: CPT

## 2023-09-06 RX ORDER — HYDROXYUREA 500 MG/1
CAPSULE ORAL
Qty: 120 CAPSULE | Refills: 1 | Status: SHIPPED | OUTPATIENT
Start: 2023-09-06

## 2023-09-06 NOTE — PATIENT INSTRUCTIONS
Platelets are 535 today. We we want them to be less than 500.    Will increase hydroxyurea to TWO capsules on Monday, Wednesday and Friday.  ONE capsule on all other days.     RTC in 4 weeks to recheck labs.     Also, found to have TP53 mutation which puts you at higher risk for some cancers. There is an increased chance to develop soft tissue sarcoma, osteosarcoma, female breast cancer, brain tumors, adrenocortical carcinoma (ACC), leukemia, and potentially other types of cancer such as prostate cancer. The chance for cancer may be more than 90%.

## 2023-09-06 NOTE — PROGRESS NOTES
[facility] GROUP HEMATOLOGY & ONCOLOGY  [address]  [place] [place] [place] [address] [phone]    Patient Name: Austin Walton  Encounter Date: 09/06/2023   YOB: 1955  Patient Number: [id number]    Hematology / Oncology Progress Note    HPI / REASON FOR VISIT: Austin Walton is a 68 y.o. 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     He had colonscopy 4/14 at [facility], Dr. Chaudhari. Wife reports it was unremarkable other than slightly enlarged prostate.      He has smoked for over 50 years and smokes approx 1 PPD or a little less.   He does report some chronic fatigue but has no acute complaint.    Pt has had platelets consistently above 500.  Since March 2023, they have been above 600.  Iron was corrected and platelets remained elevated.    (3/14/23:  622, 05/09/23:  612).  As pt is >60  years old with increased cardiovascular risk with hyperlipidemia, hypertension, he was started on Hydrea 500 mg PO daily May 9, 2023.     INTERVAL HISTORY   Pt presents to clinic today for continued follow up.  Takng oral iron once daily as well as Hydrea once daily.  He is tolerating both well.      Pt previously had IntelliGEN Myeloid panel which was positive for TP53. Discussed with him the significance of this mutation.      He had labs drawn today and results were reviewed with him in office.        LABS    Lab Results - Last 18 Months   Lab Units 09/06/23  1258 07/13/23  1235 06/06/23  1245 05/09/23  1009 06/09/22  0956 04/20/22  0945   HEMOGLOBIN g/dL 13.1 13.6 13.0 13.9 14.9 13.6   HEMATOCRIT % 39.8 41.4 40.5 44.3 45.0 41.2   MCV fL 103.1* 95.8 91.6 92.9 91.8 92.6   WBC 10*3/mm3 7.59 6.00 5.58 7.42 8.47 6.38   RDW % 16.3* 18.2* 14.6 13.1 13.2 13.7   MPV fL 8.3 8.1 8.0 8.5 8.2 8.4   PLATELETS 10*3/mm3 535* 457* 514* 612* 578* 563*   IMM GRAN % % 0.1 0.3 0.4 0.4 0.4 0.5   NEUTROS ABS  10*3/mm3 4.75 3.13 2.85 4.13 4.84 3.74   LYMPHS ABS 10*3/mm3 2.11 2.15 2.03 2.44 2.68 1.89   MONOS ABS 10*3/mm3 0.60 0.58 0.54 0.66 0.77 0.55   EOS ABS 10*3/mm3 0.09 0.08 0.09 0.13 0.11 0.14   BASOS ABS 10*3/mm3 0.03 0.04 0.05 0.03 0.04 0.03   IMMATURE GRANS (ABS) 10*3/mm3 0.01 0.02 0.02 0.03 0.03 0.03   NRBC /100 WBC 0.0 0.0 0.0 0.0 0.0 0.0       Lab Results - Last 18 Months   Lab Units 09/06/23  1258 07/13/23  1235 06/06/23  1245 05/09/23  1009 06/09/22  0956 04/20/22  0945   GLUCOSE mg/dL 95 150* 94 117* 107* 113*   SODIUM mmol/L 138 135* 137 138 136 138   POTASSIUM mmol/L 4.8 4.8 4.3 4.6 4.2 4.7   CO2 mmol/L 25.0 25.0 23.0 22.0 26.0 26.0   CHLORIDE mmol/L 103 100 103 105 101 103   ANION GAP mmol/L 10.0 10.0 11.0 11.0 9.0 9.0   CREATININE mg/dL 0.75* 0.87 0.81 0.80 0.81 0.75*   BUN mg/dL 20 19 15 27* 19 18   BUN / CREAT RATIO  26.7* 21.8 18.5 33.8* 23.5 24.0   CALCIUM mg/dL 8.8 9.4 9.3 9.1 9.8 9.5   ALK PHOS U/L 70 70 69 77 95 82   TOTAL PROTEIN g/dL 7.4 7.7 7.5 7.5 7.6 7.3   ALT (SGPT) U/L 16 15 14 19 14 14   AST (SGOT) U/L 16 20 15 16 14 16   BILIRUBIN mg/dL 0.8 0.7 0.8 0.4 0.4 0.4   ALBUMIN g/dL 4.5 4.4 4.2 4.6 4.50 4.50   GLOBULIN gm/dL 2.9 3.3 3.3 2.9 3.1 2.8       Lab Results - Last 18 Months   Lab Units 05/09/23  1140 04/20/22  0945   LDH U/L  --  227*   REFERENCE LAB REPORT  See Attached Report See Attached Report  See Attached Report       Lab Results - Last 18 Months   Lab Units 09/06/23  1258 06/06/23  1245 05/09/23  1009 06/09/22  0956 04/20/22  0945   IRON mcg/dL 91 76 52* 78 73   TIBC mcg/dL 377 346 389 426 393   IRON SATURATION (TSAT) % 24 22 13* 18* 19*   FERRITIN ng/mL 224.60 172.30 134.70 143.90 171.20   FOLATE ng/mL  --   --   --   --  12.40         PAST MEDICAL HISTORY:  ALLERGIES:  No Known Allergies  CURRENT MEDICATIONS:  Outpatient Encounter Medications as of 9/6/2023   Medication Sig Dispense Refill    aspirin 81 MG EC tablet Take 1 tablet by mouth Daily.      ferrous sulfate 325 (65 FE)  MG tablet Take 1 tablet by mouth Daily With Breakfast. 90 tablet 2    hydroxyurea (HYDREA) 500 MG capsule Take two capsules on MWF.  Take one capsules on all other days. 120 capsule 1    ibuprofen (ADVIL,MOTRIN) 200 MG tablet Take 1 tablet by mouth Every 6 (Six) Hours As Needed for Mild Pain.      lisinopril (PRINIVIL,ZESTRIL) 20 MG tablet Take 1 tablet by mouth Daily.      rosuvastatin (CRESTOR) 40 MG tablet Take 1 tablet by mouth Daily.      [DISCONTINUED] hydroxyurea (HYDREA) 500 MG capsule Take 1 capsule by mouth once daily 30 capsule 3     No facility-administered encounter medications on file as of 9/6/2023.     ADULT ILLNESSES:  Patient Active Problem List   Diagnosis Code    Hyperlipidemia E78.5     SURGERIES:  Past Surgical History:   Procedure Laterality Date    ANKLE SURGERY      screws in ankles    CARPAL TUNNEL RELEASE      COLONOSCOPY  04/13/2022    HERNIA REPAIR      TOE AMPUTATION       HEALTH MAINTENANCE ITEMS:  Health Maintenance Due   Topic Date Due    COLORECTAL CANCER SCREENING  Never done    TDAP/TD VACCINES (1 - Tdap) Never done    ZOSTER VACCINE (1 of 2) Never done    LUNG CANCER SCREENING  Never done    Pneumococcal Vaccine 65+ (2 - PPSV23 or PCV20) 12/14/2020    COVID-19 Vaccine (2 - Moderna series) 12/29/2021    HEPATITIS C SCREENING  Never done    ANNUAL WELLNESS VISIT  Never done    LIPID PANEL  Never done       <no information>  Last Completed Colonoscopy       This patient has no relevant Health Maintenance data.          Immunization History   Administered Date(s) Administered    COVID-19 (MODERNA) 1st,2nd,3rd Dose Monovalent 11/03/2021    Flublok 18+yrs 10/19/2020    Fluzone Quad >6mos (Multi-dose) 10/11/2018    Influenza, Unspecified 11/06/2016, 10/11/2018    Pneumococcal Conjugate 13-Valent (PCV13) 10/19/2020     Last Completed Mammogram       This patient has no relevant Health Maintenance data.              FAMILY HISTORY:  History reviewed. No pertinent family history.  SOCIAL  "HISTORY:  Social History     Socioeconomic History    Marital status:    Tobacco Use    Smoking status: Every Day     Packs/day: 0.50     Years: 50.00     Pack years: 25.00     Types: Cigarettes    Smokeless tobacco: Never   Vaping Use    Vaping Use: Never used   Substance and Sexual Activity    Alcohol use: Not Currently    Drug use: Not Currently    Sexual activity: Defer       REVIEW OF SYSTEMS:  Review of Systems   Constitutional:  Negative for fatigue.   Respiratory:  Negative for choking and shortness of breath.    Gastrointestinal:  Negative for blood in stool, nausea and vomiting.   Genitourinary:  Negative for hematuria.   Neurological:  Negative for headache.   Hematological:  Does not bruise/bleed easily.     /86   Pulse 69   Temp 97.4 °F (36.3 °C)   Resp 16   Ht 175.3 cm (69\")   Wt 75.7 kg (166 lb 14.4 oz)   SpO2 100%   BMI 24.65 kg/m²  Body surface area is 1.91 meters squared.    Pain Score    09/06/23 1305   PainSc: 0-No pain       Physical Exam:  Physical Exam  Constitutional:       Appearance: Normal appearance.   HENT:      Head: Normocephalic.   Eyes:      Extraocular Movements: Extraocular movements intact.   Cardiovascular:      Rate and Rhythm: Normal rate and regular rhythm.   Pulmonary:      Effort: Pulmonary effort is normal.      Breath sounds: Normal breath sounds.   Abdominal:      General: Bowel sounds are normal. There is no distension.      Tenderness: There is no abdominal tenderness.   Musculoskeletal:         General: Normal range of motion.   Skin:     General: Skin is warm and dry.   Neurological:      General: No focal deficit present.      Mental Status: He is alert and oriented to person, place, and time.   Psychiatric:         Mood and Affect: Mood normal.         Behavior: Behavior normal.       Austin Walton reports a pain score of 0.  No intervention indicated.         ASSESSMENT / PLAN      1. 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  Essential Thrombocytosis   -JAK2, MPL Negative   -As pt is >60  years old with increased cardiovascular risk with hyperlipidemia, hypertension, he was started on Hydrea 500 mg PO daily.   -03/14/23 Platelets 622, 5/9/23 Platelets 612  -Pt was started on Hydrea 500 mg PO daily   -Labs today 09/06/23:  WBC 7.59, Hgb 13.1, Hct 39.8, Plt 535  -Platelets remain > 500  -Increase Hydrea to alternating dose of 1000 mg and 500mg.    -    2.  TP 53 Mutation  -IntelliGEN Myeloid POSITIVE for TP53 Mutation.  There is an increased chance to develop soft tissue sarcoma, osteosarcoma, female breast cancer, brain tumors, adrenocortical carcinoma (ACC), leukemia, and potentially other types of cancer such as prostate cancer. The chance for cancer may be more than 90%.  -Explained to patient the recommendations which include   -Complete physical and neurologic exams every 6-12 months  -Annual whole body MRI  -Annual brain MRI  -Endoscopy and colonoscopy every 2-5 years beginning by age 25, or 5 years earlier than the youngest age of diagnosis in the family  -Annual dermatologic examination beginning by age 18  -Pt is willing to have these but would like to know what copay of these exams will be.      2.  Iron Deficiency   -Pt is taking oral iron daily   -Labs today Hgb 13.1, Hct 39.8, Iron 91, Ferritin 224, Sat 24%, TIBC 377    3.  Tobacco Use   -Currently smoking 1/2 PPD x 50 years  -Advised smoking cessation    PLAN:  Increase Hydrea to alternating dose of 1000 mg and 500mg.    Continue oral iron once daily   Continue current medications, treatment plans and follow up with PCP and any other providers  RTC 1 month   Pre-office labs for CBC, CMP,  Care discussed with patient.  Understanding expressed.  Patient agreeable with plan.    Berenice Burger, [place]  09/06/2023

## 2023-09-28 DIAGNOSIS — D75.839 THROMBOCYTOSIS, UNSPECIFIED: Primary | ICD-10-CM

## 2023-10-03 ENCOUNTER — LAB (OUTPATIENT)
Dept: LAB | Facility: HOSPITAL | Age: 68
End: 2023-10-03
Payer: MEDICARE

## 2023-10-03 ENCOUNTER — OFFICE VISIT (OUTPATIENT)
Dept: ONCOLOGY | Facility: CLINIC | Age: 68
End: 2023-10-03
Payer: MEDICARE

## 2023-10-03 VITALS
SYSTOLIC BLOOD PRESSURE: 118 MMHG | HEART RATE: 77 BPM | WEIGHT: 169.9 LBS | TEMPERATURE: 97.4 F | HEIGHT: 69 IN | DIASTOLIC BLOOD PRESSURE: 76 MMHG | RESPIRATION RATE: 16 BRPM | OXYGEN SATURATION: 95 % | BODY MASS INDEX: 25.17 KG/M2

## 2023-10-03 DIAGNOSIS — Z72.0 TOBACCO USE: ICD-10-CM

## 2023-10-03 DIAGNOSIS — Z15.01 MUTATION IN TP53 GENE: ICD-10-CM

## 2023-10-03 DIAGNOSIS — Z15.09 MUTATION IN TP53 GENE: ICD-10-CM

## 2023-10-03 DIAGNOSIS — D75.839 THROMBOCYTOSIS, UNSPECIFIED: Primary | ICD-10-CM

## 2023-10-03 DIAGNOSIS — E61.1 IRON DEFICIENCY: ICD-10-CM

## 2023-10-03 DIAGNOSIS — Z15.02 MUTATION IN TP53 GENE: ICD-10-CM

## 2023-10-03 DIAGNOSIS — D47.3 ESSENTIAL THROMBOCYTOSIS: Primary | ICD-10-CM

## 2023-10-03 LAB
ALBUMIN SERPL-MCNC: 4.3 G/DL (ref 3.5–5.2)
ALBUMIN/GLOB SERPL: 1.4 G/DL
ALP SERPL-CCNC: 68 U/L (ref 39–117)
ALT SERPL W P-5'-P-CCNC: 14 U/L (ref 1–41)
ANION GAP SERPL CALCULATED.3IONS-SCNC: 9 MMOL/L (ref 5–15)
ANISOCYTOSIS BLD QL: NORMAL
AST SERPL-CCNC: 18 U/L (ref 1–40)
BASOPHILS # BLD MANUAL: 0.07 10*3/MM3 (ref 0–0.2)
BASOPHILS NFR BLD MANUAL: 1 % (ref 0–1.5)
BILIRUB SERPL-MCNC: 0.8 MG/DL (ref 0–1.2)
BUN SERPL-MCNC: 20 MG/DL (ref 8–23)
BUN/CREAT SERPL: 23.8 (ref 7–25)
CALCIUM SPEC-SCNC: 9.4 MG/DL (ref 8.6–10.5)
CHLORIDE SERPL-SCNC: 103 MMOL/L (ref 98–107)
CO2 SERPL-SCNC: 24 MMOL/L (ref 22–29)
CREAT SERPL-MCNC: 0.84 MG/DL (ref 0.76–1.27)
DEPRECATED RDW RBC AUTO: 53.1 FL (ref 37–54)
EGFRCR SERPLBLD CKD-EPI 2021: 95 ML/MIN/1.73
EOSINOPHIL # BLD MANUAL: 0.07 10*3/MM3 (ref 0–0.4)
EOSINOPHIL NFR BLD MANUAL: 1 % (ref 0.3–6.2)
ERYTHROCYTE [DISTWIDTH] IN BLOOD BY AUTOMATED COUNT: 13.7 % (ref 12.3–15.4)
GLOBULIN UR ELPH-MCNC: 3 GM/DL
GLUCOSE SERPL-MCNC: 96 MG/DL (ref 65–99)
HCT VFR BLD AUTO: 38.7 % (ref 37.5–51)
HGB BLD-MCNC: 12.7 G/DL (ref 13–17.7)
HOLD SPECIMEN: NORMAL
LYMPHOCYTES # BLD MANUAL: 2.63 10*3/MM3 (ref 0.7–3.1)
LYMPHOCYTES NFR BLD MANUAL: 8.1 % (ref 5–12)
MACROCYTES BLD QL SMEAR: NORMAL
MCH RBC QN AUTO: 34.7 PG (ref 26.6–33)
MCHC RBC AUTO-ENTMCNC: 32.8 G/DL (ref 31.5–35.7)
MCV RBC AUTO: 105.7 FL (ref 79–97)
MONOCYTES # BLD: 0.58 10*3/MM3 (ref 0.1–0.9)
NEUTROPHILS # BLD AUTO: 3.86 10*3/MM3 (ref 1.7–7)
NEUTROPHILS NFR BLD MANUAL: 53.5 % (ref 42.7–76)
PLATELET # BLD AUTO: 458 10*3/MM3 (ref 140–450)
PMV BLD AUTO: 8.2 FL (ref 6–12)
POIKILOCYTOSIS BLD QL SMEAR: NORMAL
POTASSIUM SERPL-SCNC: 5.1 MMOL/L (ref 3.5–5.2)
PROT SERPL-MCNC: 7.3 G/DL (ref 6–8.5)
RBC # BLD AUTO: 3.66 10*6/MM3 (ref 4.14–5.8)
SMALL PLATELETS BLD QL SMEAR: NORMAL
SODIUM SERPL-SCNC: 136 MMOL/L (ref 136–145)
VARIANT LYMPHS NFR BLD MANUAL: 1 % (ref 0–5)
VARIANT LYMPHS NFR BLD MANUAL: 35.4 % (ref 19.6–45.3)
WBC MORPH BLD: NORMAL
WBC NRBC COR # BLD: 7.22 10*3/MM3 (ref 3.4–10.8)

## 2023-10-03 PROCEDURE — 85025 COMPLETE CBC W/AUTO DIFF WBC: CPT

## 2023-10-03 PROCEDURE — 36415 COLL VENOUS BLD VENIPUNCTURE: CPT

## 2023-10-03 PROCEDURE — 80053 COMPREHEN METABOLIC PANEL: CPT

## 2023-10-03 PROCEDURE — 85007 BL SMEAR W/DIFF WBC COUNT: CPT

## 2023-10-03 NOTE — PROGRESS NOTES
[facility] HEMATOLOGY & ONCOLOGY  [address]  [facility] [address] [phone]    Patient Name: Austin Walton  Encounter Date: 10/03/2023   YOB: 1955  Patient Number: [id number]    Hematology / Oncology Progress Note    HPI / REASON FOR VISIT: Austin Walton is a 68 y.o. 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     He had colonscopy 4/14 at [facility], Dr. Chaudhari. Wife reports it was unremarkable other than slightly enlarged prostate.      He has smoked for over 50 years and smokes approx 1 PPD or a little less.   He does report some chronic fatigue but has no acute complaint.    Pt has had platelets consistently above 500.  Since March 2023, they have been above 600.  Iron was corrected and platelets remained elevated.    (3/14/23:  622, 05/09/23:  612).  As pt is >60  years old with increased cardiovascular risk with hyperlipidemia, hypertension, he was started on Hydrea 500 mg PO daily May 9, 2023.     Pt previously had IntelliGEN Myeloid panel which was positive for TP53. Discussed with him the significance of this mutation.      INTERVAL HISTORY   Pt presents to clinic today for continued follow up.  Taking oral iron once daily.  Hydrea dose was increased to Alternating 1000 mg and 500 mg on September 17, 2023.  He is tolerating this dose well.     He had labs drawn today and results were reviewed with him in office.        LABS    Lab Results - Last 18 Months   Lab Units 10/03/23  1258 09/06/23  1258 07/13/23  1235 06/06/23  1245 05/09/23  1009 06/09/22  0956 04/20/22  0945   HEMOGLOBIN g/dL 12.7* 13.1 13.6 13.0 13.9 14.9 13.6   HEMATOCRIT % 38.7 39.8 41.4 40.5 44.3 45.0 41.2   MCV fL 105.7* 103.1* 95.8 91.6 92.9 91.8 92.6   WBC 10*3/mm3 7.22 7.59 6.00 5.58 7.42 8.47 6.38   RDW % 13.7 16.3* 18.2* 14.6 13.1 13.2 13.7   MPV fL 8.2 8.3 8.1 8.0 8.5 8.2  8.4   PLATELETS 10*3/mm3 458* 535* 457* 514* 612* 578* 563*   IMM GRAN % %  --  0.1 0.3 0.4 0.4 0.4 0.5   NEUTROS ABS 10*3/mm3 3.86 4.75 3.13 2.85 4.13 4.84 3.74   LYMPHS ABS 10*3/mm3  --  2.11 2.15 2.03 2.44 2.68 1.89   MONOS ABS 10*3/mm3  --  0.60 0.58 0.54 0.66 0.77 0.55   EOS ABS 10*3/mm3 0.07 0.09 0.08 0.09 0.13 0.11 0.14   BASOS ABS 10*3/mm3 0.07 0.03 0.04 0.05 0.03 0.04 0.03   IMMATURE GRANS (ABS) 10*3/mm3  --  0.01 0.02 0.02 0.03 0.03 0.03   NRBC /100 WBC  --  0.0 0.0 0.0 0.0 0.0 0.0   NEUTROPHIL % % 53.5  --   --   --   --   --   --    MONOCYTES % % 8.1  --   --   --   --   --   --    BASOPHIL % % 1.0  --   --   --   --   --   --    ATYP LYMPH % % 1.0  --   --   --   --   --   --    ANISOCYTOSIS  Slight/1+  --   --   --   --   --   --        Lab Results - Last 18 Months   Lab Units 10/03/23  1258 09/06/23  1258 07/13/23  1235 06/06/23  1245 05/09/23  1009 06/09/22  0956   GLUCOSE mg/dL 96 95 150* 94 117* 107*   SODIUM mmol/L 136 138 135* 137 138 136   POTASSIUM mmol/L 5.1 4.8 4.8 4.3 4.6 4.2   CO2 mmol/L 24.0 25.0 25.0 23.0 22.0 26.0   CHLORIDE mmol/L 103 103 100 103 105 101   ANION GAP mmol/L 9.0 10.0 10.0 11.0 11.0 9.0   CREATININE mg/dL 0.84 0.75* 0.87 0.81 0.80 0.81   BUN mg/dL 20 20 19 15 27* 19   BUN / CREAT RATIO  23.8 26.7* 21.8 18.5 33.8* 23.5   CALCIUM mg/dL 9.4 8.8 9.4 9.3 9.1 9.8   ALK PHOS U/L 68 70 70 69 77 95   TOTAL PROTEIN g/dL 7.3 7.4 7.7 7.5 7.5 7.6   ALT (SGPT) U/L 14 16 15 14 19 14   AST (SGOT) U/L 18 16 20 15 16 14   BILIRUBIN mg/dL 0.8 0.8 0.7 0.8 0.4 0.4   ALBUMIN g/dL 4.3 4.5 4.4 4.2 4.6 4.50   GLOBULIN gm/dL 3.0 2.9 3.3 3.3 2.9 3.1       Lab Results - Last 18 Months   Lab Units 05/09/23  1140 04/20/22  0945   LDH U/L  --  227*   REFERENCE LAB REPORT  See Attached Report See Attached Report  See Attached Report       Lab Results - Last 18 Months   Lab Units 09/06/23  1258 06/06/23  1245 05/09/23  1009 06/09/22  0956 04/20/22  0945   IRON mcg/dL 91 76 52* 78 73   TIBC mcg/dL 377 346  389 396 393   IRON SATURATION (TSAT) % 24 22 13* 18* 19*   FERRITIN ng/mL 224.60 172.30 134.70 143.90 171.20   FOLATE ng/mL  --   --   --   --  12.40         PAST MEDICAL HISTORY:  ALLERGIES:  No Known Allergies  CURRENT MEDICATIONS:  Outpatient Encounter Medications as of 10/3/2023   Medication Sig Dispense Refill    aspirin 81 MG EC tablet Take 1 tablet by mouth Daily.      ferrous sulfate 325 (65 FE) MG tablet Take 1 tablet by mouth Daily With Breakfast. 90 tablet 2    hydroxyurea (HYDREA) 500 MG capsule Take two capsules on MWF.  Take one capsules on all other days. 120 capsule 1    ibuprofen (ADVIL,MOTRIN) 200 MG tablet Take 1 tablet by mouth Every 6 (Six) Hours As Needed for Mild Pain.      lisinopril (PRINIVIL,ZESTRIL) 20 MG tablet Take 1 tablet by mouth Daily.      rosuvastatin (CRESTOR) 40 MG tablet Take 1 tablet by mouth Daily.       No facility-administered encounter medications on file as of 10/3/2023.     ADULT ILLNESSES:  Patient Active Problem List   Diagnosis Code    Hyperlipidemia E78.5     SURGERIES:  Past Surgical History:   Procedure Laterality Date    ANKLE SURGERY      screws in ankles    CARPAL TUNNEL RELEASE      COLONOSCOPY  04/13/2022    HERNIA REPAIR      TOE AMPUTATION       HEALTH MAINTENANCE ITEMS:  Health Maintenance Due   Topic Date Due    BMI FOLLOWUP  Never done    COLORECTAL CANCER SCREENING  Never done    TDAP/TD VACCINES (1 - Tdap) Never done    ZOSTER VACCINE (1 of 2) Never done    LUNG CANCER SCREENING  Never done    Pneumococcal Vaccine 65+ (2 - PPSV23 or PCV20) 12/14/2020    HEPATITIS C SCREENING  Never done    ANNUAL WELLNESS VISIT  Never done    LIPID PANEL  Never done    INFLUENZA VACCINE  08/01/2023    COVID-19 Vaccine (2 - 2023-24 season) 09/01/2023       <no information>  Last Completed Colonoscopy       This patient has no relevant Health Maintenance data.          Immunization History   Administered Date(s) Administered    COVID-19 (MODERNA) 1st,2nd,3rd Dose  "Monovalent 11/03/2021    Flublok 18+yrs 10/19/2020    Fluzone Quad >6mos (Multi-dose) 10/11/2018    Influenza, Unspecified 11/06/2016, 10/11/2018    Pneumococcal Conjugate 13-Valent (PCV13) 10/19/2020     Last Completed Mammogram       This patient has no relevant Health Maintenance data.              FAMILY HISTORY:  History reviewed. No pertinent family history.  SOCIAL HISTORY:  Social History     Socioeconomic History    Marital status:    Tobacco Use    Smoking status: Every Day     Packs/day: 0.50     Years: 50.00     Pack years: 25.00     Types: Cigarettes    Smokeless tobacco: Never   Vaping Use    Vaping Use: Never used   Substance and Sexual Activity    Alcohol use: Not Currently    Drug use: Not Currently    Sexual activity: Defer       REVIEW OF SYSTEMS:  Review of Systems   Constitutional:  Negative for fatigue.   Respiratory:  Negative for choking and shortness of breath.    Gastrointestinal:  Negative for blood in stool, nausea and vomiting.   Genitourinary:  Negative for hematuria.   Neurological:  Negative for headache.   Hematological:  Does not bruise/bleed easily.     /76   Pulse 77   Temp 97.4 °F (36.3 °C)   Resp 16   Ht 175.3 cm (69\")   Wt 77.1 kg (169 lb 14.4 oz)   SpO2 95%   BMI 25.09 kg/m²  Body surface area is 1.93 meters squared.    Pain Score    10/03/23 1314   PainSc: 0-No pain       Physical Exam  Constitutional:       Appearance: Normal appearance.   HENT:      Head: Normocephalic.   Eyes:      Extraocular Movements: Extraocular movements intact.   Cardiovascular:      Rate and Rhythm: Normal rate and regular rhythm.   Pulmonary:      Effort: Pulmonary effort is normal.      Breath sounds: Normal breath sounds.   Abdominal:      General: Bowel sounds are normal. There is no distension.      Tenderness: There is no abdominal tenderness.   Musculoskeletal:         General: Normal range of motion.   Skin:     General: Skin is warm and dry.   Neurological:      General: " No focal deficit present.      Mental Status: He is alert and oriented to person, place, and time.   Psychiatric:         Mood and Affect: Mood normal.         Behavior: Behavior normal.       [employer] THOMPSON Anton reports a pain score of 0.  No intervention indicated.         ASSESSMENT / PLAN      1. 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 Iron deficiency    4. Tobacco use           1.  Essential Thrombocytosis   -JAK2, MPL Negative   -As pt is >60  years old with increased cardiovascular risk with hyperlipidemia, hypertension, he was started on Hydrea 500 mg PO daily.  -September 6, 2023, Hydrea increased to 1000 mg alternating with 500 mg   -Labs today:  WBC 7.22, Hgb 12.7, Hct 38.7, Plt 458  -Platelets remain greater than goal of 100- 400  -Increase Hydrea to 1000 mg BID   -Will monitor Hemoglobin    2.  TP 53 Mutation  -IntelliGEN Myeloid POSITIVE for TP53 Mutation.  There is an increased chance to develop soft tissue sarcoma, osteosarcoma, female breast cancer, brain tumors, adrenocortical carcinoma (ACC), leukemia, and potentially other types of cancer such as prostate cancer. The chance for cancer may be more than 90%.  -Explained to patient the recommendations which include   -Complete physical and neurologic exams every 6-12 months  -Annual whole body MRI  -Annual brain MRI  -Endoscopy and colonoscopy every 2-5 years beginning by age 25, or 5 years earlier than the youngest age of diagnosis in the family  -Annual dermatologic examination beginning by age 18  -Currently, we are unable to perform whole body screening MRIs at this facility.    -Will find the closed location and be sure pt is willing to go there.    -He states he receives annual physical and is colonoscopy is up to date.     2.  Iron Deficiency   -Pt is taking oral iron daily   -Labs today Hgb 12.7, Hct 38.7  -Anemia profile 09/6/23:  Iron 91, Ferritin 224, Sat 24%, TIBC 377  -Stable for observation    3.  Tobacco Use   -Currently  smoking 1/2 PPD x 50 years  -Advised smoking cessation  -Importance of Smoking Cessation discussed with patient and informed patient additional information will be on today's AVS.         PLAN:  Increase Hydrea to alternating 1000 mg BID  Continue oral iron once daily   Continue current medications, treatment plans and follow up with PCP and any other providers  RTC 1 month   Pre-office labs for CBC, CMP,  Care discussed with patient.  Understanding expressed.  Patient agreeable with plan.    Berenice Burger, APRN  10/03/2023

## 2023-10-03 NOTE — PATIENT INSTRUCTIONS
Platelets today are better but still more than 400.    Will increase Hydrea dose to 1000 mg twice daily every day.   Hgb is a little low at 12.7.  We will monitor it.  Hydrea can make the Hgb decrease as well.   We will recheck in 1 month.

## 2023-10-04 ENCOUNTER — TRANSCRIBE ORDERS (OUTPATIENT)
Dept: ADMINISTRATIVE | Facility: HOSPITAL | Age: 68
End: 2023-10-04
Payer: MEDICARE

## 2023-10-04 DIAGNOSIS — R91.1 SOLITARY LUNG NODULE: Primary | ICD-10-CM

## 2023-10-31 ENCOUNTER — LAB (OUTPATIENT)
Dept: LAB | Facility: HOSPITAL | Age: 68
End: 2023-10-31
Payer: MEDICARE

## 2023-10-31 ENCOUNTER — OFFICE VISIT (OUTPATIENT)
Dept: ONCOLOGY | Facility: CLINIC | Age: 68
End: 2023-10-31
Payer: MEDICARE

## 2023-10-31 VITALS
WEIGHT: 168.2 LBS | RESPIRATION RATE: 16 BRPM | SYSTOLIC BLOOD PRESSURE: 118 MMHG | HEIGHT: 69 IN | OXYGEN SATURATION: 97 % | DIASTOLIC BLOOD PRESSURE: 86 MMHG | HEART RATE: 76 BPM | TEMPERATURE: 97 F | BODY MASS INDEX: 24.91 KG/M2

## 2023-10-31 DIAGNOSIS — Z72.0 TOBACCO USE: ICD-10-CM

## 2023-10-31 DIAGNOSIS — D47.3 ESSENTIAL THROMBOCYTOSIS: Primary | ICD-10-CM

## 2023-10-31 DIAGNOSIS — Z15.02 MUTATION IN TP53 GENE: ICD-10-CM

## 2023-10-31 DIAGNOSIS — Z15.09 MUTATION IN TP53 GENE: ICD-10-CM

## 2023-10-31 DIAGNOSIS — D50.9 IRON DEFICIENCY ANEMIA, UNSPECIFIED IRON DEFICIENCY ANEMIA TYPE: ICD-10-CM

## 2023-10-31 DIAGNOSIS — Z15.01 MUTATION IN TP53 GENE: ICD-10-CM

## 2023-10-31 LAB
ALBUMIN SERPL-MCNC: 4.4 G/DL (ref 3.5–5.2)
ALBUMIN/GLOB SERPL: 1.5 G/DL
ALP SERPL-CCNC: 73 U/L (ref 39–117)
ALT SERPL W P-5'-P-CCNC: 16 U/L (ref 1–41)
ANION GAP SERPL CALCULATED.3IONS-SCNC: 9 MMOL/L (ref 5–15)
AST SERPL-CCNC: 16 U/L (ref 1–40)
BASOPHILS # BLD AUTO: 0.02 10*3/MM3 (ref 0–0.2)
BASOPHILS NFR BLD AUTO: 0.4 % (ref 0–1.5)
BILIRUB SERPL-MCNC: 0.4 MG/DL (ref 0–1.2)
BUN SERPL-MCNC: 23 MG/DL (ref 8–23)
BUN/CREAT SERPL: 26.7 (ref 7–25)
CALCIUM SPEC-SCNC: 9.5 MG/DL (ref 8.6–10.5)
CHLORIDE SERPL-SCNC: 105 MMOL/L (ref 98–107)
CO2 SERPL-SCNC: 26 MMOL/L (ref 22–29)
CREAT SERPL-MCNC: 0.86 MG/DL (ref 0.76–1.27)
DEPRECATED RDW RBC AUTO: 53.5 FL (ref 37–54)
EGFRCR SERPLBLD CKD-EPI 2021: 94.3 ML/MIN/1.73
EOSINOPHIL # BLD AUTO: 0.08 10*3/MM3 (ref 0–0.4)
EOSINOPHIL NFR BLD AUTO: 1.5 % (ref 0.3–6.2)
ERYTHROCYTE [DISTWIDTH] IN BLOOD BY AUTOMATED COUNT: 13.6 % (ref 12.3–15.4)
GLOBULIN UR ELPH-MCNC: 2.9 GM/DL
GLUCOSE SERPL-MCNC: 92 MG/DL (ref 65–99)
HCT VFR BLD AUTO: 39.7 % (ref 37.5–51)
HGB BLD-MCNC: 13.1 G/DL (ref 13–17.7)
HOLD SPECIMEN: NORMAL
LYMPHOCYTES # BLD AUTO: 2.04 10*3/MM3 (ref 0.7–3.1)
LYMPHOCYTES NFR BLD AUTO: 37.4 % (ref 19.6–45.3)
MCH RBC QN AUTO: 35.5 PG (ref 26.6–33)
MCHC RBC AUTO-ENTMCNC: 33 G/DL (ref 31.5–35.7)
MCV RBC AUTO: 107.6 FL (ref 79–97)
MONOCYTES # BLD AUTO: 0.44 10*3/MM3 (ref 0.1–0.9)
MONOCYTES NFR BLD AUTO: 8.1 % (ref 5–12)
NEUTROPHILS NFR BLD AUTO: 2.85 10*3/MM3 (ref 1.7–7)
NEUTROPHILS NFR BLD AUTO: 52.2 % (ref 42.7–76)
PLATELET # BLD AUTO: 390 10*3/MM3 (ref 140–450)
PMV BLD AUTO: 8.2 FL (ref 6–12)
POTASSIUM SERPL-SCNC: 5.1 MMOL/L (ref 3.5–5.2)
PROT SERPL-MCNC: 7.3 G/DL (ref 6–8.5)
RBC # BLD AUTO: 3.69 10*6/MM3 (ref 4.14–5.8)
SODIUM SERPL-SCNC: 140 MMOL/L (ref 136–145)
WBC NRBC COR # BLD: 5.45 10*3/MM3 (ref 3.4–10.8)

## 2023-10-31 PROCEDURE — 80053 COMPREHEN METABOLIC PANEL: CPT

## 2023-10-31 PROCEDURE — 36415 COLL VENOUS BLD VENIPUNCTURE: CPT

## 2023-10-31 PROCEDURE — 85025 COMPLETE CBC W/AUTO DIFF WBC: CPT

## 2023-10-31 RX ORDER — LORATADINE 10 MG/1
1 TABLET ORAL DAILY
COMMUNITY
Start: 2023-10-27

## 2023-10-31 RX ORDER — HYDROXYUREA 500 MG/1
500 CAPSULE ORAL 2 TIMES DAILY
Qty: 180 CAPSULE | Refills: 2 | Status: SHIPPED | OUTPATIENT
Start: 2023-10-31

## 2023-10-31 RX ORDER — CIPROFLOXACIN 500 MG/1
1 TABLET, FILM COATED ORAL EVERY 12 HOURS SCHEDULED
COMMUNITY
Start: 2023-10-27

## 2023-10-31 NOTE — PROGRESS NOTES
[facility] GROUP HEMATOLOGY & ONCOLOGY  [address]  [facility] [address] [phone]    Patient Name: Austin Walton  Encounter Date: 10/31/2023   YOB: 1955  Patient Number: [id number]    Hematology / Oncology Progress Note    HPI / REASON FOR VISIT: Austin Walton is a 68 y.o. 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     He had colonscopy 4/14 at [facility], Dr. Chaudhari. Wife reports it was unremarkable other than slightly enlarged prostate.      He has smoked for over 50 years and smokes approx 1 PPD or a little less.   He does report some chronic fatigue but has no acute complaint.    Pt has had platelets consistently above 500.  Since March 2023, they have been above 600.  Iron was corrected and platelets remained elevated.    (3/14/23:  622, 05/09/23:  612).  As pt is >60  years old with increased cardiovascular risk with hyperlipidemia, hypertension, he was started on Hydrea 500 mg PO daily May 9, 2023.     Pt previously had IntelliGEN Myeloid panel which was positive for TP53. Discussed with him the significance of this mutation.      INTERVAL HISTORY   Pt presents to clinic today for continued follow up.  Taking oral iron once daily.  Hydrea dose was increased to 500 mg BID on 10/3/23.   He is tolerating this dose well.     He had labs drawn today and results were reviewed with him in office.      Pt states he hs been dizzy which he attributes to headache.  He is currently taking antibiotic for ear infection.           LABS    Lab Results - Last 18 Months   Lab Units 10/31/23  1321 10/03/23  1258 09/06/23  1258 07/13/23  1235 06/06/23  1245 05/09/23  1009 06/09/22  0956   HEMOGLOBIN g/dL 13.1 12.7* 13.1 13.6 13.0 13.9 14.9   HEMATOCRIT % 39.7 38.7 39.8 41.4 40.5 44.3 45.0   MCV fL 107.6* 105.7* 103.1* 95.8 91.6 92.9 91.8   WBC 10*3/mm3 5.45 7.22 7.59 6.00  5.58 7.42 8.47   RDW % 13.6 13.7 16.3* 18.2* 14.6 13.1 13.2   MPV fL 8.2 8.2 8.3 8.1 8.0 8.5 8.2   PLATELETS 10*3/mm3 390 458* 535* 457* 514* 612* 578*   IMM GRAN % %  --   --  0.1 0.3 0.4 0.4 0.4   NEUTROS ABS 10*3/mm3 2.85 3.86 4.75 3.13 2.85 4.13 4.84   LYMPHS ABS 10*3/mm3 2.04  --  2.11 2.15 2.03 2.44 2.68   MONOS ABS 10*3/mm3 0.44  --  0.60 0.58 0.54 0.66 0.77   EOS ABS 10*3/mm3 0.08 0.07 0.09 0.08 0.09 0.13 0.11   BASOS ABS 10*3/mm3 0.02 0.07 0.03 0.04 0.05 0.03 0.04   IMMATURE GRANS (ABS) 10*3/mm3  --   --  0.01 0.02 0.02 0.03 0.03   NRBC /100 WBC  --   --  0.0 0.0 0.0 0.0 0.0   NEUTROPHIL % %  --  53.5  --   --   --   --   --    MONOCYTES % %  --  8.1  --   --   --   --   --    BASOPHIL % %  --  1.0  --   --   --   --   --    ATYP LYMPH % %  --  1.0  --   --   --   --   --    ANISOCYTOSIS   --  Slight/1+  --   --   --   --   --        Lab Results - Last 18 Months   Lab Units 10/31/23  1321 10/03/23  1258 09/06/23  1258 07/13/23  1235 06/06/23  1245 05/09/23  1009   GLUCOSE mg/dL 92 96 95 150* 94 117*   SODIUM mmol/L 140 136 138 135* 137 138   POTASSIUM mmol/L 5.1 5.1 4.8 4.8 4.3 4.6   CO2 mmol/L 26.0 24.0 25.0 25.0 23.0 22.0   CHLORIDE mmol/L 105 103 103 100 103 105   ANION GAP mmol/L 9.0 9.0 10.0 10.0 11.0 11.0   CREATININE mg/dL 0.86 0.84 0.75* 0.87 0.81 0.80   BUN mg/dL 23 20 20 19 15 27*   BUN / CREAT RATIO  26.7* 23.8 26.7* 21.8 18.5 33.8*   CALCIUM mg/dL 9.5 9.4 8.8 9.4 9.3 9.1   ALK PHOS U/L 73 68 70 70 69 77   TOTAL PROTEIN g/dL 7.3 7.3 7.4 7.7 7.5 7.5   ALT (SGPT) U/L 16 14 16 15 14 19   AST (SGOT) U/L 16 18 16 20 15 16   BILIRUBIN mg/dL 0.4 0.8 0.8 0.7 0.8 0.4   ALBUMIN g/dL 4.4 4.3 4.5 4.4 4.2 4.6   GLOBULIN gm/dL 2.9 3.0 2.9 3.3 3.3 2.9       Lab Results - Last 18 Months   Lab Units 05/09/23  1140   REFERENCE LAB REPORT  See Attached Report       Lab Results - Last 18 Months   Lab Units 09/06/23  1258 06/06/23  1245 05/09/23  1009 06/09/22  0956   IRON mcg/dL 91 76 52* 78   TIBC mcg/dL 377 346  389 426   IRON SATURATION (TSAT) % 24 22 13* 18*   FERRITIN ng/mL 224.60 172.30 134.70 143.90         PAST MEDICAL HISTORY:  ALLERGIES:  No Known Allergies  CURRENT MEDICATIONS:  Outpatient Encounter Medications as of 10/31/2023   Medication Sig Dispense Refill    aspirin 81 MG EC tablet Take 1 tablet by mouth Daily.      ciprofloxacin (CIPRO) 500 MG tablet Take 1 tablet by mouth Every 12 (Twelve) Hours.      ferrous sulfate 325 (65 FE) MG tablet Take 1 tablet by mouth Daily With Breakfast. 90 tablet 2    hydroxyurea (HYDREA) 500 MG capsule Take 1 capsule by mouth 2 (Two) Times a Day. 180 capsule 2    ibuprofen (ADVIL,MOTRIN) 200 MG tablet Take 1 tablet by mouth Every 6 (Six) Hours As Needed for Mild Pain.      lisinopril (PRINIVIL,ZESTRIL) 20 MG tablet Take 1 tablet by mouth Daily.      loratadine (CLARITIN) 10 MG tablet Take 1 tablet by mouth Daily.      rosuvastatin (CRESTOR) 40 MG tablet Take 1 tablet by mouth Daily.      [DISCONTINUED] hydroxyurea (HYDREA) 500 MG capsule Take two capsules on MWF.  Take one capsules on all other days. 120 capsule 1     No facility-administered encounter medications on file as of 10/31/2023.     ADULT ILLNESSES:  Patient Active Problem List   Diagnosis Code    Hyperlipidemia E78.5     SURGERIES:  Past Surgical History:   Procedure Laterality Date    ANKLE SURGERY      screws in ankles    CARPAL TUNNEL RELEASE      COLONOSCOPY  04/13/2022    HERNIA REPAIR      TOE AMPUTATION       HEALTH MAINTENANCE ITEMS:  Health Maintenance Due   Topic Date Due    COLORECTAL CANCER SCREENING  Never done    TDAP/TD VACCINES (1 - Tdap) Never done    ZOSTER VACCINE (1 of 2) Never done    Pneumococcal Vaccine 65+ (2 - PPSV23 or PCV20) 12/14/2020    HEPATITIS C SCREENING  Never done    ANNUAL WELLNESS VISIT  Never done    LIPID PANEL  Never done    INFLUENZA VACCINE  08/01/2023    COVID-19 Vaccine (2 - 2023-24 season) 09/01/2023       <no information>  Last Completed Colonoscopy       This patient  "has no relevant Health Maintenance data.          Immunization History   Administered Date(s) Administered    COVID-19 (MODERNA) 1st,2nd,3rd Dose Monovalent 11/03/2021    Flublok 18+yrs 10/19/2020    Fluzone Quad >6mos (Multi-dose) 10/11/2018    Influenza, Unspecified 11/06/2016, 10/11/2018    Pneumococcal Conjugate 13-Valent (PCV13) 10/19/2020     Last Completed Mammogram       This patient has no relevant Health Maintenance data.              FAMILY HISTORY:  No family history on file.  SOCIAL HISTORY:  Social History     Socioeconomic History    Marital status:    Tobacco Use    Smoking status: Every Day     Packs/day: 0.50     Years: 50.00     Additional pack years: 0.00     Total pack years: 25.00     Types: Cigarettes    Smokeless tobacco: Never   Vaping Use    Vaping Use: Never used   Substance and Sexual Activity    Alcohol use: Not Currently    Drug use: Not Currently    Sexual activity: Defer       REVIEW OF SYSTEMS:  Review of Systems   Constitutional:  Negative for fatigue.   Respiratory:  Negative for choking and shortness of breath.    Gastrointestinal:  Negative for blood in stool, nausea and vomiting.   Genitourinary:  Negative for hematuria.   Neurological:  Negative for headache.   Hematological:  Does not bruise/bleed easily.       /86   Pulse 76   Temp 97 °F (36.1 °C)   Resp 16   Ht 175.3 cm (69\")   Wt 76.3 kg (168 lb 3.2 oz)   SpO2 97%   BMI 24.84 kg/m²  Body surface area is 1.92 meters squared.    Pain Score    10/31/23 1345   PainSc:   2   PainLoc: Head       Physical Exam  Constitutional:       Appearance: Normal appearance.   HENT:      Head: Normocephalic.   Eyes:      Extraocular Movements: Extraocular movements intact.   Cardiovascular:      Rate and Rhythm: Normal rate and regular rhythm.   Pulmonary:      Effort: Pulmonary effort is normal.      Breath sounds: Normal breath sounds.   Abdominal:      General: Bowel sounds are normal. There is no distension.      " Tenderness: There is no abdominal tenderness.   Musculoskeletal:         General: Normal range of motion.   Skin:     General: Skin is warm and dry.   Neurological:      General: No focal deficit present.      Mental Status: He is alert and oriented to person, place, and time.   Psychiatric:         Mood and Affect: Mood normal.         Behavior: Behavior normal.         Austin Walton reports a pain score of 2.  No intervention indicated.         ASSESSMENT / PLAN      1. 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 Tobacco use    4. Iron deficiency anemia, unspecified iron deficiency anemia type          1.  Essential Thrombocytosis   -JAK2, MPL Negative   -As pt is >60  years old with increased cardiovascular risk with hyperlipidemia, hypertension, he was started on Hydrea 500 mg PO daily.  -Currently taking Hydrea 500 mg PO BID  -Labs today:  WBC 5.45, Hgb 13.1, Hct 39.7, Plt 390  -Platelets are at goal < 400  -Continue Hydrea 500 mg BID   -Will monitor Hemoglobin    2.  TP 53 Mutation  -IntelliGEN Myeloid POSITIVE for TP53 Mutation.  There is an increased chance to develop soft tissue sarcoma, osteosarcoma, female breast cancer, brain tumors, adrenocortical carcinoma (ACC), leukemia, and potentially other types of cancer such as prostate cancer. The chance for cancer may be more than 90%.  -Explained to patient the recommendations which include   -Complete physical and neurologic exams every 6-12 months  -Annual whole body MRI  -Annual brain MRI  -Endoscopy and colonoscopy every 2-5 years beginning by age 25, or 5 years earlier than the youngest age of diagnosis in the family  -Annual dermatologic examination beginning by age 18  -Currently, we are unable to perform whole body screening MRIs at this facility.    -He states he receives annual physical and is colonoscopy is up to date.   -Pt is concerned for cost of increased screening.  Currently wishes to observe.     2.  Iron Deficiency   -Pt is  taking oral iron daily   -Labs today Hgb 13.1, Hct 39.7  -Anemia profile 09/6/23:  Iron 91, Ferritin 224, Sat 24%, TIBC 377  -Stable for observation    3.  Tobacco Use   -Currently smoking 1/2 PPD x 50 years  -Advised smoking cessation  -Scheduled for CT of Chest Thurday to follow up on CT in May with 7 mm nodule noted  -Importance of Smoking Cessation discussed with patient and informed patient additional information will be on today's AVS.     Heaches and dizziness not better by end of the week he will let me know and I will order CT head     PLAN:  Continue Hydrea 500  mg BID  Continue oral iron once daily   Continue current medications, treatment plans and follow up with PCP and any other providers  Labs only in 6 weeks at local facility.  Orders printed and given to patient  RTC 3  month   Pre-office labs for CBC, CMP, Iron Profile and Ferritin   Care discussed with patient.  Understanding expressed.  Patient agreeable with plan.    Berenice Burger, APRN  10/31/2023

## 2023-10-31 NOTE — LETTER
November 12, 2023       No Recipients    Patient: Austin Walton   YOB: 1955   Date of Visit: 10/31/2023     Dear Henny Sanchez MD:       Thank you for referring Austin Walton to me for evaluation. Below are the relevant portions of my assessment and plan of care.    If you have questions, please do not hesitate to call me. I look forward to following Austin along with you.         Sincerely,        BROOKLYN Frances        CC:   No Recipients    Berenice Burger APRN  11/12/23 1358  Sign when Signing Visit  [facility] HEMATOLOGY & ONCOLOGY  [address]  [place] [address] [phone]    Patient Name: Austin Walton  Encounter Date: 10/31/2023   YOB: 1955  Patient Number: [id number]    Hematology / Oncology Progress Note    HPI / REASON FOR VISIT: Austin Walton is a 68 y.o. 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     He had colonscopy 4/14 at [facility], Dr. Chaudhari. Wife reports it was unremarkable other than slightly enlarged prostate.      He has smoked for over 50 years and smokes approx 1 PPD or a little less.   He does report some chronic fatigue but has no acute complaint.    Pt has had platelets consistently above 500.  Since March 2023, they have been above 600.  Iron was corrected and platelets remained elevated.    (3/14/23:  622, 05/09/23:  612).  As pt is >60  years old with increased cardiovascular risk with hyperlipidemia, hypertension, he was started on Hydrea 500 mg PO daily May 9, 2023.     Pt previously had IntelliGEN Myeloid panel which was positive for TP53. Discussed with him the significance of this mutation.      INTERVAL HISTORY   Pt presents to clinic today for continued follow up.  Taking oral iron once daily.  Hydrea dose was increased to 500 mg BID on 10/3/23.   He is tolerating this dose well.     He had  labs drawn today and results were reviewed with him in office.      Pt states he hs been dizzy which he attributes to headache.  He is currently taking antibiotic for ear infection.           LABS    Lab Results - Last 18 Months   Lab Units 10/31/23  1321 10/03/23  1258 09/06/23  1258 07/13/23  1235 06/06/23  1245 05/09/23  1009 06/09/22  0956   HEMOGLOBIN g/dL 13.1 12.7* 13.1 13.6 13.0 13.9 14.9   HEMATOCRIT % 39.7 38.7 39.8 41.4 40.5 44.3 45.0   MCV fL 107.6* 105.7* 103.1* 95.8 91.6 92.9 91.8   WBC 10*3/mm3 5.45 7.22 7.59 6.00 5.58 7.42 8.47   RDW % 13.6 13.7 16.3* 18.2* 14.6 13.1 13.2   MPV fL 8.2 8.2 8.3 8.1 8.0 8.5 8.2   PLATELETS 10*3/mm3 390 458* 535* 457* 514* 612* 578*   IMM GRAN % %  --   --  0.1 0.3 0.4 0.4 0.4   NEUTROS ABS 10*3/mm3 2.85 3.86 4.75 3.13 2.85 4.13 4.84   LYMPHS ABS 10*3/mm3 2.04  --  2.11 2.15 2.03 2.44 2.68   MONOS ABS 10*3/mm3 0.44  --  0.60 0.58 0.54 0.66 0.77   EOS ABS 10*3/mm3 0.08 0.07 0.09 0.08 0.09 0.13 0.11   BASOS ABS 10*3/mm3 0.02 0.07 0.03 0.04 0.05 0.03 0.04   IMMATURE GRANS (ABS) 10*3/mm3  --   --  0.01 0.02 0.02 0.03 0.03   NRBC /100 WBC  --   --  0.0 0.0 0.0 0.0 0.0   NEUTROPHIL % %  --  53.5  --   --   --   --   --    MONOCYTES % %  --  8.1  --   --   --   --   --    BASOPHIL % %  --  1.0  --   --   --   --   --    ATYP LYMPH % %  --  1.0  --   --   --   --   --    ANISOCYTOSIS   --  Slight/1+  --   --   --   --   --        Lab Results - Last 18 Months   Lab Units 10/31/23  1321 10/03/23  1258 09/06/23  1258 07/13/23  1235 06/06/23  1245 05/09/23  1009   GLUCOSE mg/dL 92 96 95 150* 94 117*   SODIUM mmol/L 140 136 138 135* 137 138   POTASSIUM mmol/L 5.1 5.1 4.8 4.8 4.3 4.6   CO2 mmol/L 26.0 24.0 25.0 25.0 23.0 22.0   CHLORIDE mmol/L 105 103 103 100 103 105   ANION GAP mmol/L 9.0 9.0 10.0 10.0 11.0 11.0   CREATININE mg/dL 0.86 0.84 0.75* 0.87 0.81 0.80   BUN mg/dL 23 20 20 19 15 27*   BUN / CREAT RATIO  26.7* 23.8 26.7* 21.8 18.5 33.8*   CALCIUM mg/dL 9.5 9.4 8.8 9.4 9.3 9.1    ALK PHOS U/L 73 68 70 70 69 77   TOTAL PROTEIN g/dL 7.3 7.3 7.4 7.7 7.5 7.5   ALT (SGPT) U/L 16 14 16 15 14 19   AST (SGOT) U/L 16 18 16 20 15 16   BILIRUBIN mg/dL 0.4 0.8 0.8 0.7 0.8 0.4   ALBUMIN g/dL 4.4 4.3 4.5 4.4 4.2 4.6   GLOBULIN gm/dL 2.9 3.0 2.9 3.3 3.3 2.9       Lab Results - Last 18 Months   Lab Units 05/09/23  1140   REFERENCE LAB REPORT  See Attached Report       Lab Results - Last 18 Months   Lab Units 09/06/23  1258 06/06/23  1245 05/09/23  1009 06/09/22  0956   IRON mcg/dL 91 76 52* 78   TIBC mcg/dL 377 346 389 426   IRON SATURATION (TSAT) % 24 22 13* 18*   FERRITIN ng/mL 224.60 172.30 134.70 143.90         PAST MEDICAL HISTORY:  ALLERGIES:  No Known Allergies  CURRENT MEDICATIONS:  Outpatient Encounter Medications as of 10/31/2023   Medication Sig Dispense Refill   • aspirin 81 MG EC tablet Take 1 tablet by mouth Daily.     • ciprofloxacin (CIPRO) 500 MG tablet Take 1 tablet by mouth Every 12 (Twelve) Hours.     • ferrous sulfate 325 (65 FE) MG tablet Take 1 tablet by mouth Daily With Breakfast. 90 tablet 2   • hydroxyurea (HYDREA) 500 MG capsule Take 1 capsule by mouth 2 (Two) Times a Day. 180 capsule 2   • ibuprofen (ADVIL,MOTRIN) 200 MG tablet Take 1 tablet by mouth Every 6 (Six) Hours As Needed for Mild Pain.     • lisinopril (PRINIVIL,ZESTRIL) 20 MG tablet Take 1 tablet by mouth Daily.     • loratadine (CLARITIN) 10 MG tablet Take 1 tablet by mouth Daily.     • rosuvastatin (CRESTOR) 40 MG tablet Take 1 tablet by mouth Daily.     • [DISCONTINUED] hydroxyurea (HYDREA) 500 MG capsule Take two capsules on MWF.  Take one capsules on all other days. 120 capsule 1     No facility-administered encounter medications on file as of 10/31/2023.     ADULT ILLNESSES:  Patient Active Problem List   Diagnosis Code   • Hyperlipidemia E78.5     SURGERIES:  Past Surgical History:   Procedure Laterality Date   • ANKLE SURGERY      screws in ankles   • CARPAL TUNNEL RELEASE     • COLONOSCOPY  04/13/2022   •  "HERNIA REPAIR     • TOE AMPUTATION       HEALTH MAINTENANCE ITEMS:  Health Maintenance Due   Topic Date Due   • COLORECTAL CANCER SCREENING  Never done   • TDAP/TD VACCINES (1 - Tdap) Never done   • ZOSTER VACCINE (1 of 2) Never done   • Pneumococcal Vaccine 65+ (2 - PPSV23 or PCV20) 12/14/2020   • HEPATITIS C SCREENING  Never done   • ANNUAL WELLNESS VISIT  Never done   • LIPID PANEL  Never done   • INFLUENZA VACCINE  08/01/2023   • COVID-19 Vaccine (2 - 2023-24 season) 09/01/2023       <no information>  Last Completed Colonoscopy       This patient has no relevant Health Maintenance data.          Immunization History   Administered Date(s) Administered   • COVID-19 (MODERNA) 1st,2nd,3rd Dose Monovalent 11/03/2021   • Flublok 18+yrs 10/19/2020   • Fluzone Quad >6mos (Multi-dose) 10/11/2018   • Influenza, Unspecified 11/06/2016, 10/11/2018   • Pneumococcal Conjugate 13-Valent (PCV13) 10/19/2020     Last Completed Mammogram       This patient has no relevant Health Maintenance data.              FAMILY HISTORY:  No family history on file.  SOCIAL HISTORY:  Social History     Socioeconomic History   • Marital status:    Tobacco Use   • Smoking status: Every Day     Packs/day: 0.50     Years: 50.00     Additional pack years: 0.00     Total pack years: 25.00     Types: Cigarettes   • Smokeless tobacco: Never   Vaping Use   • Vaping Use: Never used   Substance and Sexual Activity   • Alcohol use: Not Currently   • Drug use: Not Currently   • Sexual activity: Defer       REVIEW OF SYSTEMS:  Review of Systems   Constitutional:  Negative for fatigue.   Respiratory:  Negative for choking and shortness of breath.    Gastrointestinal:  Negative for blood in stool, nausea and vomiting.   Genitourinary:  Negative for hematuria.   Neurological:  Negative for headache.   Hematological:  Does not bruise/bleed easily.       /86   Pulse 76   Temp 97 °F (36.1 °C)   Resp 16   Ht 175.3 cm (69\")   Wt 76.3 kg (168 lb " 3.2 oz)   SpO2 97%   BMI 24.84 kg/m²  Body surface area is 1.92 meters squared.    Pain Score    10/31/23 1345   PainSc:   2   PainLoc: Head       Physical Exam  Constitutional:       Appearance: Normal appearance.   HENT:      Head: Normocephalic.   Eyes:      Extraocular Movements: Extraocular movements intact.   Cardiovascular:      Rate and Rhythm: Normal rate and regular rhythm.   Pulmonary:      Effort: Pulmonary effort is normal.      Breath sounds: Normal breath sounds.   Abdominal:      General: Bowel sounds are normal. There is no distension.      Tenderness: There is no abdominal tenderness.   Musculoskeletal:         General: Normal range of motion.   Skin:     General: Skin is warm and dry.   Neurological:      General: No focal deficit present.      Mental Status: He is alert and oriented to person, place, and time.   Psychiatric:         Mood and Affect: Mood normal.         Behavior: Behavior normal.         Austin THOMPSON Anton reports a pain score of 2.  No intervention indicated.         ASSESSMENT / PLAN      1. 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 Tobacco use    4. Iron deficiency anemia, unspecified iron deficiency anemia type          1.  Essential Thrombocytosis   -JAK2, MPL Negative   -As pt is >60  years old with increased cardiovascular risk with hyperlipidemia, hypertension, he was started on Hydrea 500 mg PO daily.  -Currently taking Hydrea 500 mg PO BID  -Labs today:  WBC 5.45, Hgb 13.1, Hct 39.7, Plt 390  -Platelets are at goal < 400  -Continue Hydrea 500 mg BID   -Will monitor Hemoglobin    2.  TP 53 Mutation  -IntelliGEN Myeloid POSITIVE for TP53 Mutation.  There is an increased chance to develop soft tissue sarcoma, osteosarcoma, female breast cancer, brain tumors, adrenocortical carcinoma (ACC), leukemia, and potentially other types of cancer such as prostate cancer. The chance for cancer may be more than 90%.  -Explained to patient the recommendations which  include   -Complete physical and neurologic exams every 6-12 months  -Annual whole body MRI  -Annual brain MRI  -Endoscopy and colonoscopy every 2-5 years beginning by age 25, or 5 years earlier than the youngest age of diagnosis in the family  -Annual dermatologic examination beginning by age 18  -Currently, we are unable to perform whole body screening MRIs at this facility.    -He states he receives annual physical and is colonoscopy is up to date.   -Pt is concerned for cost of increased screening.  Currently wishes to observe.     2.  Iron Deficiency   -Pt is taking oral iron daily   -Labs today Hgb 13.1, Hct 39.7  -Anemia profile 09/6/23:  Iron 91, Ferritin 224, Sat 24%, TIBC 377  -Stable for observation    3.  Tobacco Use   -Currently smoking 1/2 PPD x 50 years  -Advised smoking cessation  -Scheduled for CT of Chest Thurday to follow up on CT in May with 7 mm nodule noted  -Importance of Smoking Cessation discussed with patient and informed patient additional information will be on today's AVS.     Heaches and dizziness not better by end of the week he will let me know and I will order CT head     PLAN:  Continue Hydrea 500  mg BID  Continue oral iron once daily   Continue current medications, treatment plans and follow up with PCP and any other providers  Labs only in 6 weeks at local facility.  Orders printed and given to patient  RTC 3  month   Pre-office labs for CBC, CMP, Iron Profile and Ferritin   Care discussed with patient.  Understanding expressed.  Patient agreeable with plan.    Berenice Burger, APRN  10/31/2023

## 2023-11-02 ENCOUNTER — HOSPITAL ENCOUNTER (OUTPATIENT)
Dept: CT IMAGING | Facility: HOSPITAL | Age: 68
Discharge: HOME | End: 2023-11-02
Admitting: INTERNAL MEDICINE
Payer: MEDICARE

## 2023-11-02 DIAGNOSIS — R91.1 SOLITARY LUNG NODULE: ICD-10-CM

## 2023-11-02 PROCEDURE — 71250 CT THORAX DX C-: CPT

## 2024-01-30 ENCOUNTER — LAB (OUTPATIENT)
Dept: LAB | Facility: HOSPITAL | Age: 69
End: 2024-01-30
Payer: MEDICARE

## 2024-01-30 ENCOUNTER — OFFICE VISIT (OUTPATIENT)
Dept: ONCOLOGY | Facility: CLINIC | Age: 69
End: 2024-01-30
Payer: MEDICARE

## 2024-01-30 VITALS
RESPIRATION RATE: 16 BRPM | BODY MASS INDEX: 25.52 KG/M2 | HEIGHT: 69 IN | DIASTOLIC BLOOD PRESSURE: 86 MMHG | OXYGEN SATURATION: 98 % | TEMPERATURE: 97.9 F | WEIGHT: 172.3 LBS | HEART RATE: 80 BPM | SYSTOLIC BLOOD PRESSURE: 122 MMHG

## 2024-01-30 DIAGNOSIS — E61.1 IRON DEFICIENCY: ICD-10-CM

## 2024-01-30 DIAGNOSIS — Z15.09 MUTATION IN TP53 GENE: ICD-10-CM

## 2024-01-30 DIAGNOSIS — D75.89 MACROCYTOSIS: ICD-10-CM

## 2024-01-30 DIAGNOSIS — Z15.01 MUTATION IN TP53 GENE: Primary | ICD-10-CM

## 2024-01-30 DIAGNOSIS — D47.3 ESSENTIAL THROMBOCYTOSIS: ICD-10-CM

## 2024-01-30 DIAGNOSIS — D50.9 IRON DEFICIENCY ANEMIA, UNSPECIFIED IRON DEFICIENCY ANEMIA TYPE: Primary | ICD-10-CM

## 2024-01-30 DIAGNOSIS — Z15.02 MUTATION IN TP53 GENE: ICD-10-CM

## 2024-01-30 DIAGNOSIS — Z15.01 MUTATION IN TP53 GENE: ICD-10-CM

## 2024-01-30 DIAGNOSIS — R39.11 HESITANCY OF MICTURITION: ICD-10-CM

## 2024-01-30 DIAGNOSIS — Z15.09 MUTATION IN TP53 GENE: Primary | ICD-10-CM

## 2024-01-30 DIAGNOSIS — Z15.02 MUTATION IN TP53 GENE: Primary | ICD-10-CM

## 2024-01-30 LAB
ALBUMIN SERPL-MCNC: 4.4 G/DL (ref 3.5–5.2)
ALBUMIN/GLOB SERPL: 1.4 G/DL
ALP SERPL-CCNC: 78 U/L (ref 39–117)
ALT SERPL W P-5'-P-CCNC: 13 U/L (ref 1–41)
ANION GAP SERPL CALCULATED.3IONS-SCNC: 11 MMOL/L (ref 5–15)
AST SERPL-CCNC: 13 U/L (ref 1–40)
BASOPHILS # BLD MANUAL: 0.11 10*3/MM3 (ref 0–0.2)
BASOPHILS NFR BLD MANUAL: 2 % (ref 0–1.5)
BILIRUB SERPL-MCNC: 0.6 MG/DL (ref 0–1.2)
BUN SERPL-MCNC: 20 MG/DL (ref 8–23)
BUN/CREAT SERPL: 25.6 (ref 7–25)
CALCIUM SPEC-SCNC: 9 MG/DL (ref 8.6–10.5)
CHLORIDE SERPL-SCNC: 96 MMOL/L (ref 98–107)
CO2 SERPL-SCNC: 24 MMOL/L (ref 22–29)
CREAT SERPL-MCNC: 0.78 MG/DL (ref 0.76–1.27)
DEPRECATED RDW RBC AUTO: 62.8 FL (ref 37–54)
EGFRCR SERPLBLD CKD-EPI 2021: 97.1 ML/MIN/1.73
ERYTHROCYTE [DISTWIDTH] IN BLOOD BY AUTOMATED COUNT: 14.9 % (ref 12.3–15.4)
FERRITIN SERPL-MCNC: 352.1 NG/ML (ref 30–400)
GLOBULIN UR ELPH-MCNC: 3.2 GM/DL
GLUCOSE SERPL-MCNC: 94 MG/DL (ref 65–99)
HCT VFR BLD AUTO: 37.4 % (ref 37.5–51)
HGB BLD-MCNC: 12.8 G/DL (ref 13–17.7)
HOLD SPECIMEN: NORMAL
IRON 24H UR-MRATE: 95 MCG/DL (ref 59–158)
IRON SATN MFR SERPL: 26 % (ref 20–50)
LYMPHOCYTES # BLD MANUAL: 2.11 10*3/MM3 (ref 0.7–3.1)
LYMPHOCYTES NFR BLD MANUAL: 8 % (ref 5–12)
MACROCYTES BLD QL SMEAR: ABNORMAL
MCH RBC QN AUTO: 38.8 PG (ref 26.6–33)
MCHC RBC AUTO-ENTMCNC: 34.2 G/DL (ref 31.5–35.7)
MCV RBC AUTO: 113.3 FL (ref 79–97)
MONOCYTES # BLD: 0.43 10*3/MM3 (ref 0.1–0.9)
NEUTROPHILS # BLD AUTO: 2.76 10*3/MM3 (ref 1.7–7)
NEUTROPHILS NFR BLD MANUAL: 50 % (ref 42.7–76)
NEUTS BAND NFR BLD MANUAL: 1 % (ref 0–5)
PLAT MORPH BLD: NORMAL
PLATELET # BLD AUTO: 340 10*3/MM3 (ref 140–450)
PMV BLD AUTO: 8.1 FL (ref 6–12)
POLYCHROMASIA BLD QL SMEAR: ABNORMAL
POTASSIUM SERPL-SCNC: 4.8 MMOL/L (ref 3.5–5.2)
PROT SERPL-MCNC: 7.6 G/DL (ref 6–8.5)
PSA SERPL-MCNC: 2.45 NG/ML (ref 0–4)
RBC # BLD AUTO: 3.3 10*6/MM3 (ref 4.14–5.8)
SODIUM SERPL-SCNC: 131 MMOL/L (ref 136–145)
TIBC SERPL-MCNC: 365 MCG/DL (ref 298–536)
TRANSFERRIN SERPL-MCNC: 245 MG/DL (ref 200–360)
VARIANT LYMPHS NFR BLD MANUAL: 2 % (ref 0–5)
VARIANT LYMPHS NFR BLD MANUAL: 37 % (ref 19.6–45.3)
VIT B12 BLD-MCNC: 580 PG/ML (ref 211–946)
WBC MORPH BLD: NORMAL
WBC NRBC COR # BLD AUTO: 5.41 10*3/MM3 (ref 3.4–10.8)

## 2024-01-30 PROCEDURE — 82728 ASSAY OF FERRITIN: CPT

## 2024-01-30 PROCEDURE — 82607 VITAMIN B-12: CPT

## 2024-01-30 PROCEDURE — 83540 ASSAY OF IRON: CPT

## 2024-01-30 PROCEDURE — 84466 ASSAY OF TRANSFERRIN: CPT

## 2024-01-30 PROCEDURE — 80053 COMPREHEN METABOLIC PANEL: CPT

## 2024-01-30 PROCEDURE — 85007 BL SMEAR W/DIFF WBC COUNT: CPT

## 2024-01-30 PROCEDURE — 84153 ASSAY OF PSA TOTAL: CPT

## 2024-01-30 PROCEDURE — 85025 COMPLETE CBC W/AUTO DIFF WBC: CPT

## 2024-01-30 PROCEDURE — 36415 COLL VENOUS BLD VENIPUNCTURE: CPT

## 2024-01-30 NOTE — PROGRESS NOTES
[facility] HEMATOLOGY & ONCOLOGY  [address]  [place] [address] [phone]    Patient Name: Austin Walton  Encounter Date: 01/30/2024   YOB: 1955  Patient Number: [id number]    Hematology / Oncology Progress Note    HPI / REASON FOR VISIT: Austin Walton is a 68 y.o. 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     He had colonscopy 4/14 at [facility], Dr. Chaudhari. Wife reports it was unremarkable other than slightly enlarged prostate.      He has smoked for over 50 years and smokes approx 1 PPD or a little less.   He does report some chronic fatigue but has no acute complaint.    Pt has had platelets consistently above 500.  Since March 2023, they have been above 600.  Iron was corrected and platelets remained elevated.    (3/14/23:  622, 05/09/23:  612).  As pt is >60  years old with increased cardiovascular risk with hyperlipidemia, hypertension, he was started on Hydrea 500 mg PO daily May 9, 2023.     Pt previously had IntelliGEN Myeloid panel which was positive for TP53. Discussed with him the significance of this mutation.      INTERVAL HISTORY   Pt presents to clinic today for continued follow up.  He continues to take oral iron once daily.  Hydrea dose was increased to 500 mg BID on 10/3/23.   He is tolerating this dose well.     Pt reports he is having urinary hesitancy and frequency..     He had labs drawn today and results were reviewed with him in office.              LABS    Lab Results - Last 18 Months   Lab Units 01/30/24  1035 10/31/23  1321 10/03/23  1258 09/06/23  1258 07/13/23  1235 06/06/23  1245 05/09/23  1009   HEMOGLOBIN g/dL 12.8* 13.1 12.7* 13.1 13.6 13.0 13.9   HEMATOCRIT % 37.4* 39.7 38.7 39.8 41.4 40.5 44.3   MCV fL 113.3* 107.6* 105.7* 103.1* 95.8 91.6 92.9   WBC 10*3/mm3 5.41 5.45 7.22 7.59 6.00 5.58 7.42   RDW % 14.9 13.6  13.7 16.3* 18.2* 14.6 13.1   MPV fL 8.1 8.2 8.2 8.3 8.1 8.0 8.5   PLATELETS 10*3/mm3 340 390 458* 535* 457* 514* 612*   IMM GRAN % %  --   --   --  0.1 0.3 0.4 0.4   NEUTROS ABS 10*3/mm3 2.76 2.85 3.86 4.75 3.13 2.85 4.13   LYMPHS ABS 10*3/mm3  --  2.04  --  2.11 2.15 2.03 2.44   MONOS ABS 10*3/mm3  --  0.44  --  0.60 0.58 0.54 0.66   EOS ABS 10*3/mm3  --  0.08 0.07 0.09 0.08 0.09 0.13   BASOS ABS 10*3/mm3 0.11 0.02 0.07 0.03 0.04 0.05 0.03   IMMATURE GRANS (ABS) 10*3/mm3  --   --   --  0.01 0.02 0.02 0.03   NRBC /100 WBC  --   --   --  0.0 0.0 0.0 0.0   NEUTROPHIL % % 50.0  --  53.5  --   --   --   --    MONOCYTES % % 8.0  --  8.1  --   --   --   --    BASOPHIL % % 2.0*  --  1.0  --   --   --   --    ATYP LYMPH % % 2.0  --  1.0  --   --   --   --    ANISOCYTOSIS   --   --  Slight/1+  --   --   --   --        Lab Results - Last 18 Months   Lab Units 01/30/24  1035 10/31/23  1321 10/03/23  1258 09/06/23  1258 07/13/23  1235 06/06/23  1245   GLUCOSE mg/dL 94 92 96 95 150* 94   SODIUM mmol/L 131* 140 136 138 135* 137   POTASSIUM mmol/L 4.8 5.1 5.1 4.8 4.8 4.3   CO2 mmol/L 24.0 26.0 24.0 25.0 25.0 23.0   CHLORIDE mmol/L 96* 105 103 103 100 103   ANION GAP mmol/L 11.0 9.0 9.0 10.0 10.0 11.0   CREATININE mg/dL 0.78 0.86 0.84 0.75* 0.87 0.81   BUN mg/dL 20 23 20 20 19 15   BUN / CREAT RATIO  25.6* 26.7* 23.8 26.7* 21.8 18.5   CALCIUM mg/dL 9.0 9.5 9.4 8.8 9.4 9.3   ALK PHOS U/L 78 73 68 70 70 69   TOTAL PROTEIN g/dL 7.6 7.3 7.3 7.4 7.7 7.5   ALT (SGPT) U/L 13 16 14 16 15 14   AST (SGOT) U/L 13 16 18 16 20 15   BILIRUBIN mg/dL 0.6 0.4 0.8 0.8 0.7 0.8   ALBUMIN g/dL 4.4 4.4 4.3 4.5 4.4 4.2   GLOBULIN gm/dL 3.2 2.9 3.0 2.9 3.3 3.3       Lab Results - Last 18 Months   Lab Units 05/09/23  1140   REFERENCE LAB REPORT  See Attached Report       Lab Results - Last 18 Months   Lab Units 01/30/24  1035 09/06/23  1258 06/06/23  1245 05/09/23  1009   IRON mcg/dL 95 91 76 52*   TIBC mcg/dL 365 377 346 389   IRON SATURATION (TSAT) % 26  24 22 13*   FERRITIN ng/mL 352.10 224.60 172.30 134.70         PAST MEDICAL HISTORY:  ALLERGIES:  No Known Allergies  CURRENT MEDICATIONS:  Outpatient Encounter Medications as of 1/30/2024   Medication Sig Dispense Refill    aspirin 81 MG EC tablet Take 1 tablet by mouth Daily.      ferrous sulfate 325 (65 FE) MG tablet Take 1 tablet by mouth Daily With Breakfast. 90 tablet 2    hydroxyurea (HYDREA) 500 MG capsule Take 1 capsule by mouth 2 (Two) Times a Day. 180 capsule 2    ibuprofen (ADVIL,MOTRIN) 200 MG tablet Take 1 tablet by mouth Every 6 (Six) Hours As Needed for Mild Pain.      lisinopril (PRINIVIL,ZESTRIL) 20 MG tablet Take 1 tablet by mouth Daily.      loratadine (CLARITIN) 10 MG tablet Take 1 tablet by mouth Daily.      multivitamin with minerals (PRESERVISION AREDS PO) Take 1 tablet by mouth Daily.      rosuvastatin (CRESTOR) 40 MG tablet Take 1 tablet by mouth Daily.      [DISCONTINUED] ciprofloxacin (CIPRO) 500 MG tablet Take 1 tablet by mouth Every 12 (Twelve) Hours. (Patient not taking: Reported on 1/30/2024)       No facility-administered encounter medications on file as of 1/30/2024.     ADULT ILLNESSES:  Patient Active Problem List   Diagnosis Code    Hyperlipidemia E78.5     SURGERIES:  Past Surgical History:   Procedure Laterality Date    ANKLE SURGERY      screws in ankles    CARPAL TUNNEL RELEASE      COLONOSCOPY  04/13/2022    HERNIA REPAIR      TOE AMPUTATION       HEALTH MAINTENANCE ITEMS:  Health Maintenance Due   Topic Date Due    BMI FOLLOWUP  Never done    COLORECTAL CANCER SCREENING  Never done    TDAP/TD VACCINES (1 - Tdap) Never done    ZOSTER VACCINE (1 of 2) Never done    Pneumococcal Vaccine 65+ (2 of 2 - PPSV23 or PCV20) 12/14/2020    HEPATITIS C SCREENING  Never done    ANNUAL WELLNESS VISIT  Never done    LIPID PANEL  Never done    INFLUENZA VACCINE  08/01/2023    COVID-19 Vaccine (2 - 2023-24 season) 09/01/2023       <no information>  Last Completed Colonoscopy       This  "patient has no relevant Health Maintenance data.          Immunization History   Administered Date(s) Administered    COVID-19 (MODERNA) 1st,2nd,3rd Dose Monovalent 11/03/2021    Flublok 18+yrs 10/19/2020    Fluzone Quad >6mos (Multi-dose) 10/11/2018    Influenza, Unspecified 11/06/2016, 10/11/2018    Pneumococcal Conjugate 13-Valent (PCV13) 10/19/2020     Last Completed Mammogram       This patient has no relevant Health Maintenance data.              FAMILY HISTORY:  History reviewed. No pertinent family history.  SOCIAL HISTORY:  Social History     Socioeconomic History    Marital status:    Tobacco Use    Smoking status: Every Day     Packs/day: 0.50     Years: 50.00     Additional pack years: 0.00     Total pack years: 25.00     Types: Cigarettes    Smokeless tobacco: Never   Vaping Use    Vaping Use: Never used   Substance and Sexual Activity    Alcohol use: Not Currently    Drug use: Not Currently    Sexual activity: Defer       REVIEW OF SYSTEMS:  Review of Systems   Constitutional:  Negative for fatigue.   Respiratory:  Negative for choking and shortness of breath.    Gastrointestinal:  Negative for blood in stool, nausea and vomiting.   Genitourinary:  Positive for difficulty urinating (Hesitency) and frequency. Negative for hematuria.   Neurological:  Positive for dizziness. Negative for headache.   Hematological:  Does not bruise/bleed easily.       /86   Pulse 80   Temp 97.9 °F (36.6 °C)   Resp 16   Ht 175.3 cm (69\")   Wt 78.2 kg (172 lb 4.8 oz)   SpO2 98%   BMI 25.44 kg/m²  Body surface area is 1.94 meters squared.    Pain Score    01/30/24 1043   PainSc: 0-No pain       Physical Exam  Constitutional:       Appearance: Normal appearance.   HENT:      Head: Normocephalic.   Eyes:      Extraocular Movements: Extraocular movements intact.   Cardiovascular:      Rate and Rhythm: Normal rate and regular rhythm.   Pulmonary:      Effort: Pulmonary effort is normal.      Breath sounds: " Normal breath sounds.   Abdominal:      General: Bowel sounds are normal. There is no distension.      Tenderness: There is no abdominal tenderness.   Musculoskeletal:         General: Normal range of motion.   Skin:     General: Skin is warm and dry.   Neurological:      General: No focal deficit present.      Mental Status: He is alert and oriented to person, place, and time.   Psychiatric:         Mood and Affect: Mood normal.         Behavior: Behavior normal.         Austin THOMPSON Anton reports a pain score of 0.  No intervention indicated.         ASSESSMENT / PLAN      1. 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 Macrocytosis    3. Essential thrombocytosis    4. Hesitancy of micturition            1.  Essential Thrombocytosis   -JAK2, MPL Negative   -As pt is >60  years old with increased cardiovascular risk with hyperlipidemia, hypertension, he was started on Hydrea 500 mg PO daily.  -Currently taking Hydrea 500 mg PO BID  -Labs today:  WBC 5.41, Hgb 12.8, Hct 37.4, Plt 340  -Iron 95, Ferritin 352, Sat 26, TIBC 365  -Platelets are at goal < 400  -Continue Hydrea 500 mg BID   -Will monitor Hemoglobin    2.  TP 53 Mutation  -IntelliGEN Myeloid POSITIVE for TP53 Mutation.  There is an increased chance to develop soft tissue sarcoma, osteosarcoma, female breast cancer, brain tumors, adrenocortical carcinoma (ACC), leukemia, and potentially other types of cancer such as prostate cancer. The chance for cancer may be more than 90%.  -Explained to patient the recommendations which include   -Complete physical and neurologic exams every 6-12 months  -Annual whole body MRI  -Annual brain MRI  -Endoscopy and colonoscopy every 2-5 years beginning by age 25, or 5 years earlier than the youngest age of diagnosis in the family (Colonoscopy was 04/13/2022)  -Annual dermatologic examination beginning by age 18  -Currently, we are unable to perform whole body screening MRIs at this facility.    -He states he receives annual physical and is  colonoscopy is up to date.   -Discussed increased screening again today and pt declines.  Pt is concerned for cost of increased screening.  Currently wishes to observe.     3.  Iron Deficiency / Macrocytosis   -Pt is taking oral iron daily   -Labs today :   Hgb 12.8, Hct 37.4, Plt 340  -Iron 95, Ferritin 352, Sat 26, TIBC 365  -Stable for observation .  Continue oral iron.  -Will check B12 r/t macrocytosis    4.  Tobacco Use   -Currently smoking 1/2 PPD x 50 years  -Advised smoking cessation  -Low Dose CT May 2, 2023  1.No acute abnormality is identified within the thorax.    2.Partially solid/cystic nodule within the right upper lobe measures approximately 7 mm     3.Pulmonary emphysema with scattered bibasilar atelectasis.   -Importance of Smoking Cessation discussed with patient and informed patient additional information will be on today's AVS.      5.  Urinary Hesitancy   -Pt reports hesitancy   -Pt has history of enlarged prostate in addition to TP53 mutation   -Will check PSA and refer to urology as indicated.    PLAN:  Continue Hydrea 500  mg BID  Continue oral iron once daily   Continue current medications, treatment plans and follow up with PCP and any other providers  RTC 3  month   Pre-office labs for CBC, CMP, Iron Profile and Ferritin   Care discussed with patient.  Understanding expressed.  Patient agreeable with plan.    Berenice Burger, APRN  01/30/2024

## 2024-01-31 ENCOUNTER — TELEPHONE (OUTPATIENT)
Dept: ONCOLOGY | Facility: CLINIC | Age: 69
End: 2024-01-31
Payer: MEDICARE

## 2024-01-31 NOTE — TELEPHONE ENCOUNTER
----- Message from BROOKLYN Frances sent at 1/30/2024  7:27 PM CST -----  Please let him know his PSA is normal

## 2024-03-03 DIAGNOSIS — D50.9 IRON DEFICIENCY ANEMIA, UNSPECIFIED IRON DEFICIENCY ANEMIA TYPE: Primary | ICD-10-CM

## 2024-03-04 RX ORDER — FERROUS SULFATE 325(65) MG
1 TABLET ORAL
Qty: 90 TABLET | Refills: 0 | Status: SHIPPED | OUTPATIENT
Start: 2024-03-04

## 2024-03-12 LAB — REF LAB TEST METHOD: NORMAL

## 2024-04-30 ENCOUNTER — OFFICE VISIT (OUTPATIENT)
Dept: ONCOLOGY | Facility: CLINIC | Age: 69
End: 2024-04-30
Payer: MEDICARE

## 2024-04-30 ENCOUNTER — LAB (OUTPATIENT)
Dept: LAB | Facility: HOSPITAL | Age: 69
End: 2024-04-30
Payer: MEDICARE

## 2024-04-30 VITALS
HEIGHT: 69 IN | BODY MASS INDEX: 25.95 KG/M2 | DIASTOLIC BLOOD PRESSURE: 84 MMHG | WEIGHT: 175.2 LBS | OXYGEN SATURATION: 100 % | TEMPERATURE: 97.2 F | HEART RATE: 62 BPM | SYSTOLIC BLOOD PRESSURE: 118 MMHG | RESPIRATION RATE: 16 BRPM

## 2024-04-30 DIAGNOSIS — Z15.89 MUTATION IN TP53 GENE: ICD-10-CM

## 2024-04-30 DIAGNOSIS — D47.3 ESSENTIAL THROMBOCYTOSIS: Primary | ICD-10-CM

## 2024-04-30 DIAGNOSIS — D50.9 IRON DEFICIENCY ANEMIA, UNSPECIFIED IRON DEFICIENCY ANEMIA TYPE: ICD-10-CM

## 2024-04-30 DIAGNOSIS — Z72.0 TOBACCO USE: ICD-10-CM

## 2024-04-30 DIAGNOSIS — E61.1 IRON DEFICIENCY: ICD-10-CM

## 2024-04-30 DIAGNOSIS — Z15.01 MUTATION IN TP53 GENE: ICD-10-CM

## 2024-04-30 DIAGNOSIS — Z15.09 MUTATION IN TP53 GENE: ICD-10-CM

## 2024-04-30 LAB
ALBUMIN SERPL-MCNC: 4.5 G/DL (ref 3.5–5.2)
ALBUMIN/GLOB SERPL: 1.6 G/DL
ALP SERPL-CCNC: 80 U/L (ref 39–117)
ALT SERPL W P-5'-P-CCNC: 16 U/L (ref 1–41)
ANION GAP SERPL CALCULATED.3IONS-SCNC: 10 MMOL/L (ref 5–15)
AST SERPL-CCNC: 18 U/L (ref 1–40)
BASOPHILS # BLD AUTO: 0.02 10*3/MM3 (ref 0–0.2)
BASOPHILS NFR BLD AUTO: 0.4 % (ref 0–1.5)
BILIRUB SERPL-MCNC: 0.4 MG/DL (ref 0–1.2)
BUN SERPL-MCNC: 18 MG/DL (ref 8–23)
BUN/CREAT SERPL: 20.9 (ref 7–25)
CALCIUM SPEC-SCNC: 9.2 MG/DL (ref 8.6–10.5)
CHLORIDE SERPL-SCNC: 98 MMOL/L (ref 98–107)
CO2 SERPL-SCNC: 24 MMOL/L (ref 22–29)
CREAT SERPL-MCNC: 0.86 MG/DL (ref 0.76–1.27)
DEPRECATED RDW RBC AUTO: 54.7 FL (ref 37–54)
EGFRCR SERPLBLD CKD-EPI 2021: 94.3 ML/MIN/1.73
EOSINOPHIL # BLD AUTO: 0.02 10*3/MM3 (ref 0–0.4)
EOSINOPHIL NFR BLD AUTO: 0.4 % (ref 0.3–6.2)
ERYTHROCYTE [DISTWIDTH] IN BLOOD BY AUTOMATED COUNT: 12.3 % (ref 12.3–15.4)
FERRITIN SERPL-MCNC: 440.2 NG/ML (ref 30–400)
GLOBULIN UR ELPH-MCNC: 2.9 GM/DL
GLUCOSE SERPL-MCNC: 113 MG/DL (ref 65–99)
HCT VFR BLD AUTO: 34.8 % (ref 37.5–51)
HGB BLD-MCNC: 11.8 G/DL (ref 13–17.7)
HOLD SPECIMEN: NORMAL
IRON 24H UR-MRATE: 103 MCG/DL (ref 59–158)
IRON SATN MFR SERPL: 29 % (ref 20–50)
LYMPHOCYTES # BLD AUTO: 1.93 10*3/MM3 (ref 0.7–3.1)
LYMPHOCYTES NFR BLD AUTO: 35.6 % (ref 19.6–45.3)
MCH RBC QN AUTO: 40.5 PG (ref 26.6–33)
MCHC RBC AUTO-ENTMCNC: 33.9 G/DL (ref 31.5–35.7)
MCV RBC AUTO: 119.6 FL (ref 79–97)
MONOCYTES # BLD AUTO: 0.54 10*3/MM3 (ref 0.1–0.9)
MONOCYTES NFR BLD AUTO: 10 % (ref 5–12)
NEUTROPHILS NFR BLD AUTO: 2.88 10*3/MM3 (ref 1.7–7)
NEUTROPHILS NFR BLD AUTO: 53 % (ref 42.7–76)
PLATELET # BLD AUTO: 346 10*3/MM3 (ref 140–450)
PMV BLD AUTO: 8.2 FL (ref 6–12)
POTASSIUM SERPL-SCNC: 5.4 MMOL/L (ref 3.5–5.2)
PROT SERPL-MCNC: 7.4 G/DL (ref 6–8.5)
RBC # BLD AUTO: 2.91 10*6/MM3 (ref 4.14–5.8)
SODIUM SERPL-SCNC: 132 MMOL/L (ref 136–145)
TIBC SERPL-MCNC: 361 MCG/DL (ref 298–536)
TRANSFERRIN SERPL-MCNC: 242 MG/DL (ref 200–360)
WBC NRBC COR # BLD AUTO: 5.42 10*3/MM3 (ref 3.4–10.8)

## 2024-04-30 PROCEDURE — 84466 ASSAY OF TRANSFERRIN: CPT

## 2024-04-30 PROCEDURE — 83540 ASSAY OF IRON: CPT

## 2024-04-30 PROCEDURE — 36415 COLL VENOUS BLD VENIPUNCTURE: CPT

## 2024-04-30 PROCEDURE — 80053 COMPREHEN METABOLIC PANEL: CPT

## 2024-04-30 PROCEDURE — 85025 COMPLETE CBC W/AUTO DIFF WBC: CPT

## 2024-04-30 PROCEDURE — 82728 ASSAY OF FERRITIN: CPT

## 2024-04-30 RX ORDER — ERGOCALCIFEROL 1.25 MG/1
1 CAPSULE ORAL WEEKLY
COMMUNITY
Start: 2024-04-09

## 2024-04-30 NOTE — PROGRESS NOTES
[facility] HEMATOLOGY & ONCOLOGY  [address]  [place] [address] [phone]    Patient Name: Austin Walton  Encounter Date: 04/30/2024   YOB: 1955  Patient Number: [id number]    Hematology / Oncology Progress Note    HPI / REASON FOR VISIT: Austin Walton is a 68 y.o. 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       He had colonscopy 4/14 at [facility], Dr. Chaudhari. Wife reports it was unremarkable other than slightly enlarged prostate.      He has smoked for over 50 years and smokes approx 1 PPD or a little less.   He does report some chronic fatigue but has no acute complaint.    Pt has had platelets consistently above 500.  Since March 2023, they have been above 600.  Iron was corrected and platelets remained elevated.    (3/14/23:  622, 05/09/23:  612).  As pt is >60  years old with increased cardiovascular risk with hyperlipidemia, hypertension, he was started on Hydrea 500 mg PO daily May 9, 2023.     Pt previously had [employer] Myeloid panel which was positive for TP53. Discussed with him the significance of this mutation.      INTERVAL HISTORY   Pt presents to clinic today for continued follow up.  He continues to take oral iron once daily.  Current Hydrea dose is 500 mg BID.   He is tolerating this dose well.     He has no acute complaints today.       He had labs drawn today and results were reviewed with him in office.              LABS    Lab Results - Last 18 Months   Lab Units 04/30/24  0744 01/30/24  1035 10/31/23  1321 10/03/23  1258 09/06/23  1258 07/13/23  1235 06/06/23  1245 05/09/23  1009   HEMOGLOBIN g/dL 11.8* 12.8* 13.1 12.7* 13.1 13.6 13.0 13.9   HEMATOCRIT % 34.8* 37.4* 39.7 38.7 39.8 41.4 40.5 44.3   MCV fL 119.6* 113.3* 107.6* 105.7* 103.1* 95.8 91.6 92.9   WBC 10*3/mm3 5.42 5.41 5.45  7.22 7.59 6.00 5.58 7.42   RDW % 12.3 14.9 13.6 13.7 16.3* 18.2* 14.6 13.1   MPV fL 8.2 8.1 8.2 8.2 8.3 8.1 8.0 8.5   PLATELETS 10*3/mm3 346 340 390 458* 535* 457* 514* 612*   IMM GRAN % %  --   --   --   --  0.1 0.3 0.4 0.4   NEUTROS ABS 10*3/mm3 2.88 2.76 2.85 3.86 4.75 3.13 2.85 4.13   LYMPHS ABS 10*3/mm3 1.93  --  2.04  --  2.11 2.15 2.03 2.44   MONOS ABS 10*3/mm3 0.54  --  0.44  --  0.60 0.58 0.54 0.66   EOS ABS 10*3/mm3 0.02  --  0.08 0.07 0.09 0.08 0.09 0.13   BASOS ABS 10*3/mm3 0.02 0.11 0.02 0.07 0.03 0.04 0.05 0.03   IMMATURE GRANS (ABS) 10*3/mm3  --   --   --   --  0.01 0.02 0.02 0.03   NRBC /100 WBC  --   --   --   --  0.0 0.0 0.0 0.0   NEUTROPHIL % %  --  50.0  --  53.5  --   --   --   --    MONOCYTES % %  --  8.0  --  8.1  --   --   --   --    BASOPHIL % %  --  2.0*  --  1.0  --   --   --   --    ATYP LYMPH % %  --  2.0  --  1.0  --   --   --   --    ANISOCYTOSIS   --   --   --  Slight/1+  --   --   --   --        Lab Results - Last 18 Months   Lab Units 04/30/24  0744 01/30/24  1035 10/31/23  1321 10/03/23  1258 09/06/23  1258 07/13/23  1235   GLUCOSE mg/dL 113* 94 92 96 95 150*   SODIUM mmol/L 132* 131* 140 136 138 135*   POTASSIUM mmol/L 5.4* 4.8 5.1 5.1 4.8 4.8   CO2 mmol/L 24.0 24.0 26.0 24.0 25.0 25.0   CHLORIDE mmol/L 98 96* 105 103 103 100   ANION GAP mmol/L 10.0 11.0 9.0 9.0 10.0 10.0   CREATININE mg/dL 0.86 0.78 0.86 0.84 0.75* 0.87   BUN mg/dL 18 20 23 20 20 19   BUN / CREAT RATIO  20.9 25.6* 26.7* 23.8 26.7* 21.8   CALCIUM mg/dL 9.2 9.0 9.5 9.4 8.8 9.4   ALK PHOS U/L 80 78 73 68 70 70   TOTAL PROTEIN g/dL 7.4 7.6 7.3 7.3 7.4 7.7   ALT (SGPT) U/L 16 13 16 14 16 15   AST (SGOT) U/L 18 13 16 18 16 20   BILIRUBIN mg/dL 0.4 0.6 0.4 0.8 0.8 0.7   ALBUMIN g/dL 4.5 4.4 4.4 4.3 4.5 4.4   GLOBULIN gm/dL 2.9 3.2 2.9 3.0 2.9 3.3       Lab Results - Last 18 Months   Lab Units 01/30/24  1035 05/09/23  1140   REFERENCE LAB REPORT  See Attached Report See Attached Report       Lab Results - Last 18 Months    Lab Units 04/30/24  0744 01/30/24  1035 09/06/23  1258 06/06/23  1245 05/09/23  1009   IRON mcg/dL 103 95 91 76 52*   TIBC mcg/dL 361 365 377 346 389   IRON SATURATION (TSAT) % 29 26 24 22 13*   FERRITIN ng/mL 440.20* 352.10 224.60 172.30 134.70         PAST MEDICAL HISTORY:  ALLERGIES:  No Known Allergies  CURRENT MEDICATIONS:  Outpatient Encounter Medications as of 4/30/2024   Medication Sig Dispense Refill    aspirin 81 MG EC tablet Take 1 tablet by mouth Daily.      hydroxyurea (HYDREA) 500 MG capsule Take 1 capsule by mouth 2 (Two) Times a Day. 180 capsule 2    ibuprofen (ADVIL,MOTRIN) 200 MG tablet Take 1 tablet by mouth Every 6 (Six) Hours As Needed for Mild Pain.      lisinopril (PRINIVIL,ZESTRIL) 20 MG tablet Take 1 tablet by mouth Daily.      loratadine (CLARITIN) 10 MG tablet Take 1 tablet by mouth Daily.      multivitamin with minerals (PRESERVISION AREDS PO) Take 1 tablet by mouth Daily.      rosuvastatin (CRESTOR) 40 MG tablet Take 1 tablet by mouth Daily.      SV Iron 325 (65 Fe) MG tablet Take 1 tablet by mouth once daily with breakfast 90 tablet 0    vitamin D (ERGOCALCIFEROL) 1.25 MG (09854 UT) capsule capsule Take 1 capsule by mouth 1 (One) Time Per Week.       No facility-administered encounter medications on file as of 4/30/2024.     ADULT ILLNESSES:  Patient Active Problem List   Diagnosis Code    Hyperlipidemia E78.5     SURGERIES:  Past Surgical History:   Procedure Laterality Date    ANKLE SURGERY      screws in ankles    CARPAL TUNNEL RELEASE      COLONOSCOPY  04/13/2022    HERNIA REPAIR      TOE AMPUTATION       HEALTH MAINTENANCE ITEMS:  Health Maintenance Due   Topic Date Due    LIPID PANEL  Never done    BMI FOLLOWUP  Never done    COLORECTAL CANCER SCREENING  Never done    TDAP/TD VACCINES (1 - Tdap) Never done    ZOSTER VACCINE (1 of 2) Never done    RSV Vaccine - Adults (1 - 1-dose 60+ series) Never done    Pneumococcal Vaccine 65+ (2 of 2 - PPSV23 or PCV20) 12/14/2020    HEPATITIS  "C SCREENING  Never done    ANNUAL WELLNESS VISIT  Never done    COVID-19 Vaccine (2 - 2023-24 season) 09/01/2023       <no information>  Last Completed Colonoscopy       This patient has no relevant Health Maintenance data.          Immunization History   Administered Date(s) Administered    COVID-19 (MODERNA) 1st,2nd,3rd Dose Monovalent 11/03/2021    Flublok 18+yrs 10/19/2020    Fluzone Quad >6mos (Multi-dose) 10/11/2018    Influenza, Unspecified 11/06/2016, 10/11/2018    Pneumococcal Conjugate 13-Valent (PCV13) 10/19/2020     Last Completed Mammogram       This patient has no relevant Health Maintenance data.              FAMILY HISTORY:  History reviewed. No pertinent family history.  SOCIAL HISTORY:  Social History     Socioeconomic History    Marital status:    Tobacco Use    Smoking status: Every Day     Current packs/day: 0.50     Average packs/day: 0.5 packs/day for 50.0 years (25.0 ttl pk-yrs)     Types: Cigarettes    Smokeless tobacco: Never   Vaping Use    Vaping status: Never Used   Substance and Sexual Activity    Alcohol use: Not Currently    Drug use: Not Currently    Sexual activity: Defer       REVIEW OF SYSTEMS:  Review of Systems   Constitutional:  Negative for fatigue.   Respiratory:  Negative for choking and shortness of breath.    Gastrointestinal:  Negative for blood in stool, nausea and vomiting.   Genitourinary:  Positive for difficulty urinating (Hesitency) and frequency. Negative for hematuria.   Neurological:  Positive for dizziness. Negative for headache.   Hematological:  Does not bruise/bleed easily.       /84   Pulse 62   Temp 97.2 °F (36.2 °C)   Resp 16   Ht 175.3 cm (69\")   Wt 79.5 kg (175 lb 3.2 oz)   SpO2 100%   BMI 25.87 kg/m²  Body surface area is 1.95 meters squared.    Pain Score    04/30/24 0754   PainSc: 0-No pain         Physical Exam  Constitutional:       Appearance: Normal appearance.   HENT:      Head: Normocephalic.   Eyes:      Extraocular " Movements: Extraocular movements intact.   Cardiovascular:      Rate and Rhythm: Normal rate and regular rhythm.   Pulmonary:      Effort: Pulmonary effort is normal.      Breath sounds: Normal breath sounds.   Abdominal:      General: Bowel sounds are normal. There is no distension.      Tenderness: There is no abdominal tenderness.   Musculoskeletal:         General: Normal range of motion.   Skin:     General: Skin is warm and dry.   Neurological:      General: No focal deficit present.      Mental Status: He is alert and oriented to person, place, and time.   Psychiatric:         Mood and Affect: Mood normal.         Behavior: Behavior normal.         Austin Walton reports a pain score of 0.  No intervention indicated.         ASSESSMENT / PLAN      1. 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 Iron deficiency anemia, unspecified iron deficiency anemia type    4. Tobacco use      -Will monitor Hemoglobin   Essential Thrombocytosis  2.  TP 53 Mutation  -LACY 2, CALR, MPL Negative Essential Thrombocytosis  -Labs today:  WBC 5.42, Hgb 11.8, Hct 34.8, Plt 346  -Pt is taking Hydrea 500 mg BID  - Stable on current dose     -[facility] Myeloid POSITIVE for TP53 Mutation.  There is an increased chance to develop soft tissue sarcoma, osteosarcoma, female breast cancer, brain tumors, adrenocortical carcinoma (ACC), leukemia, and potentially other types of cancer such as prostate cancer. The chance for cancer may be more than 90%.  -Explained to patient the recommendations which include   -Complete physical and neurologic exams every 6-12 months  -Annual whole body MRI  -Annual brain MRI  -Endoscopy and colonoscopy every 2-5 years beginning by age 25, or 5 years earlier than the youngest age of diagnosis in the family (Colonoscopy was 04/13/2022)  -Annual dermatologic examination beginning by age 18  -Currently, we are unable to perform whole body screening MRIs at this facility.    -He states he receives  annual physical and is colonoscopy is up to date.   -Discussed increased screening again previous and pt declined.  Pt is concerned for cost of increased screening.  Currently wishes to observe.     3.  Iron Deficiency / Macrocytosis   -Pt is taking oral iron daily   -Labs today :    Hgb 11.8, Hct 34.8, Iron 103, Ferritin 440, Sat 29%, TIBC 361  -Decrease oral iron to QOD     4.  Tobacco Use   -Currently smoking 1/2 PPD x 50 years  -Advised smoking cessation  -Low Dose CT May 2, 2023  1.No acute abnormality is identified within the thorax.    2.Partially solid/cystic nodule within the right upper lobe measures approximately 7 mm     3.Pulmonary emphysema with scattered bibasilar atelectasis.   -Importance of Smoking Cessation discussed with patient and informed patient additional information will be on today's AVS.        PLAN:  Continue Hydrea 500  mg BID  Continue oral iron once daily   Continue current medications, treatment plans and follow up with PCP and any other providers  Labs only in 3 months.  RTC 6 months  Pre-office labs for CBC, CMP, Iron Profile and Ferritin   Care discussed with patient.  Understanding expressed.  Patient agreeable with plan.    Berenice Burger, APRN  04/30/2024

## 2024-06-01 DIAGNOSIS — D50.9 IRON DEFICIENCY ANEMIA, UNSPECIFIED IRON DEFICIENCY ANEMIA TYPE: ICD-10-CM

## 2024-06-03 RX ORDER — FERROUS SULFATE 325(65) MG
1 TABLET ORAL
Qty: 90 TABLET | Refills: 0 | Status: SHIPPED | OUTPATIENT
Start: 2024-06-03

## 2024-07-20 DIAGNOSIS — D47.3 ESSENTIAL THROMBOCYTOSIS: ICD-10-CM

## 2024-07-22 RX ORDER — HYDROXYUREA 500 MG/1
500 CAPSULE ORAL 2 TIMES DAILY
Qty: 180 CAPSULE | Refills: 0 | Status: SHIPPED | OUTPATIENT
Start: 2024-07-22

## 2024-07-30 ENCOUNTER — LAB (OUTPATIENT)
Dept: LAB | Facility: HOSPITAL | Age: 69
End: 2024-07-30
Payer: MEDICARE

## 2024-07-30 DIAGNOSIS — D50.9 IRON DEFICIENCY ANEMIA, UNSPECIFIED IRON DEFICIENCY ANEMIA TYPE: ICD-10-CM

## 2024-07-30 LAB
ALBUMIN SERPL-MCNC: 4.2 G/DL (ref 3.5–5.2)
ALBUMIN/GLOB SERPL: 1.4 G/DL
ALP SERPL-CCNC: 74 U/L (ref 39–117)
ALT SERPL W P-5'-P-CCNC: 15 U/L (ref 1–41)
ANION GAP SERPL CALCULATED.3IONS-SCNC: 11 MMOL/L (ref 5–15)
AST SERPL-CCNC: 15 U/L (ref 1–40)
BASOPHILS # BLD AUTO: 0.01 10*3/MM3 (ref 0–0.2)
BASOPHILS NFR BLD AUTO: 0.2 % (ref 0–1.5)
BILIRUB SERPL-MCNC: 0.4 MG/DL (ref 0–1.2)
BUN SERPL-MCNC: 24 MG/DL (ref 8–23)
BUN/CREAT SERPL: 26.1 (ref 7–25)
CALCIUM SPEC-SCNC: 9.2 MG/DL (ref 8.6–10.5)
CHLORIDE SERPL-SCNC: 99 MMOL/L (ref 98–107)
CO2 SERPL-SCNC: 23 MMOL/L (ref 22–29)
CREAT SERPL-MCNC: 0.92 MG/DL (ref 0.76–1.27)
DEPRECATED RDW RBC AUTO: 53.4 FL (ref 37–54)
EGFRCR SERPLBLD CKD-EPI 2021: 90 ML/MIN/1.73
EOSINOPHIL # BLD AUTO: 0.04 10*3/MM3 (ref 0–0.4)
EOSINOPHIL NFR BLD AUTO: 0.8 % (ref 0.3–6.2)
ERYTHROCYTE [DISTWIDTH] IN BLOOD BY AUTOMATED COUNT: 12.6 % (ref 12.3–15.4)
FERRITIN SERPL-MCNC: 394.7 NG/ML (ref 30–400)
GLOBULIN UR ELPH-MCNC: 3 GM/DL
GLUCOSE SERPL-MCNC: 85 MG/DL (ref 65–99)
HCT VFR BLD AUTO: 31.6 % (ref 37.5–51)
HGB BLD-MCNC: 11.1 G/DL (ref 13–17.7)
IMM GRANULOCYTES # BLD AUTO: 0.02 10*3/MM3 (ref 0–0.05)
IMM GRANULOCYTES NFR BLD AUTO: 0.4 % (ref 0–0.5)
IRON 24H UR-MRATE: 62 MCG/DL (ref 59–158)
IRON SATN MFR SERPL: 19 % (ref 20–50)
LYMPHOCYTES # BLD AUTO: 1.97 10*3/MM3 (ref 0.7–3.1)
LYMPHOCYTES NFR BLD AUTO: 38.9 % (ref 19.6–45.3)
MCH RBC QN AUTO: 40.4 PG (ref 26.6–33)
MCHC RBC AUTO-ENTMCNC: 35.1 G/DL (ref 31.5–35.7)
MCV RBC AUTO: 114.9 FL (ref 79–97)
MONOCYTES # BLD AUTO: 0.5 10*3/MM3 (ref 0.1–0.9)
MONOCYTES NFR BLD AUTO: 9.9 % (ref 5–12)
NEUTROPHILS NFR BLD AUTO: 2.52 10*3/MM3 (ref 1.7–7)
NEUTROPHILS NFR BLD AUTO: 49.8 % (ref 42.7–76)
NRBC BLD AUTO-RTO: 0 /100 WBC (ref 0–0.2)
PLATELET # BLD AUTO: 367 10*3/MM3 (ref 140–450)
PMV BLD AUTO: 8 FL (ref 6–12)
POTASSIUM SERPL-SCNC: 4.3 MMOL/L (ref 3.5–5.2)
PROT SERPL-MCNC: 7.2 G/DL (ref 6–8.5)
RBC # BLD AUTO: 2.75 10*6/MM3 (ref 4.14–5.8)
SODIUM SERPL-SCNC: 133 MMOL/L (ref 136–145)
TIBC SERPL-MCNC: 334 MCG/DL (ref 298–536)
TRANSFERRIN SERPL-MCNC: 224 MG/DL (ref 200–360)
WBC NRBC COR # BLD AUTO: 5.06 10*3/MM3 (ref 3.4–10.8)

## 2024-07-30 PROCEDURE — 82728 ASSAY OF FERRITIN: CPT

## 2024-07-30 PROCEDURE — 80053 COMPREHEN METABOLIC PANEL: CPT

## 2024-07-30 PROCEDURE — 36415 COLL VENOUS BLD VENIPUNCTURE: CPT

## 2024-07-30 PROCEDURE — 83540 ASSAY OF IRON: CPT

## 2024-07-30 PROCEDURE — 85025 COMPLETE CBC W/AUTO DIFF WBC: CPT

## 2024-07-30 PROCEDURE — 84466 ASSAY OF TRANSFERRIN: CPT

## 2024-08-06 ENCOUNTER — TELEPHONE (OUTPATIENT)
Dept: ONCOLOGY | Facility: CLINIC | Age: 69
End: 2024-08-06

## 2024-08-06 NOTE — TELEPHONE ENCOUNTER
Caller: Austin Walton    Relationship: Self    Best call back number: [phone]     Caller requesting test results: YES    What test was performed: LABS    When was the test performed: 7/30    Where was the test performed:     Additional notes: PLEASE CALL PT TO ADVISE ON LAB RESULTS.

## 2024-08-07 ENCOUNTER — TELEPHONE (OUTPATIENT)
Dept: ONCOLOGY | Facility: CLINIC | Age: 69
End: 2024-08-07
Payer: MEDICARE

## 2024-08-07 NOTE — TELEPHONE ENCOUNTER
Called patient, left message of results.  Continue hydrea at current dose 500 mg BID.  Continue oral iron.  Asked patient to call back with any questions or concerns.

## 2024-08-07 NOTE — TELEPHONE ENCOUNTER
Can you please call him and let him know his labs are good.  His Hgb is a little low but that is because of the Hydrea.  He should continue the Hydrea at the same dose which is 500 mg BID and continue oral iron daily.   Thank you

## 2024-08-30 DIAGNOSIS — D50.9 IRON DEFICIENCY ANEMIA, UNSPECIFIED IRON DEFICIENCY ANEMIA TYPE: ICD-10-CM

## 2024-08-30 RX ORDER — FERROUS SULFATE 325(65) MG
1 TABLET ORAL
Qty: 90 TABLET | Refills: 0 | Status: SHIPPED | OUTPATIENT
Start: 2024-08-30

## 2024-10-19 DIAGNOSIS — D47.3 ESSENTIAL THROMBOCYTOSIS: ICD-10-CM

## 2024-10-21 RX ORDER — HYDROXYUREA 500 MG/1
500 CAPSULE ORAL 2 TIMES DAILY
Qty: 180 CAPSULE | Refills: 0 | Status: SHIPPED | OUTPATIENT
Start: 2024-10-21

## 2024-10-29 ENCOUNTER — OFFICE VISIT (OUTPATIENT)
Dept: ONCOLOGY | Facility: CLINIC | Age: 69
End: 2024-10-29
Payer: MEDICARE

## 2024-10-29 ENCOUNTER — LAB (OUTPATIENT)
Dept: LAB | Facility: HOSPITAL | Age: 69
End: 2024-10-29
Payer: MEDICARE

## 2024-10-29 VITALS
WEIGHT: 163.2 LBS | TEMPERATURE: 97.7 F | OXYGEN SATURATION: 97 % | HEART RATE: 74 BPM | HEIGHT: 69 IN | RESPIRATION RATE: 18 BRPM | DIASTOLIC BLOOD PRESSURE: 64 MMHG | BODY MASS INDEX: 24.17 KG/M2 | SYSTOLIC BLOOD PRESSURE: 126 MMHG

## 2024-10-29 DIAGNOSIS — D50.9 IRON DEFICIENCY ANEMIA, UNSPECIFIED IRON DEFICIENCY ANEMIA TYPE: ICD-10-CM

## 2024-10-29 DIAGNOSIS — Z15.01 MUTATION IN TP53 GENE: ICD-10-CM

## 2024-10-29 DIAGNOSIS — D47.3 ESSENTIAL THROMBOCYTOSIS: ICD-10-CM

## 2024-10-29 DIAGNOSIS — E87.5 HYPERKALEMIA: ICD-10-CM

## 2024-10-29 DIAGNOSIS — Z72.0 TOBACCO USE: ICD-10-CM

## 2024-10-29 DIAGNOSIS — N28.9 RENAL INSUFFICIENCY: Primary | ICD-10-CM

## 2024-10-29 DIAGNOSIS — Z15.09 MUTATION IN TP53 GENE: ICD-10-CM

## 2024-10-29 DIAGNOSIS — Z15.89 MUTATION IN TP53 GENE: ICD-10-CM

## 2024-10-29 LAB
ALBUMIN SERPL-MCNC: 4.2 G/DL (ref 3.5–5.2)
ALBUMIN/GLOB SERPL: 1.5 G/DL
ALP SERPL-CCNC: 80 U/L (ref 39–117)
ALT SERPL W P-5'-P-CCNC: 15 U/L (ref 1–41)
ANION GAP SERPL CALCULATED.3IONS-SCNC: 11 MMOL/L (ref 5–15)
AST SERPL-CCNC: 16 U/L (ref 1–40)
BASOPHILS # BLD AUTO: 0.02 10*3/MM3 (ref 0–0.2)
BASOPHILS NFR BLD AUTO: 0.4 % (ref 0–1.5)
BILIRUB SERPL-MCNC: 0.4 MG/DL (ref 0–1.2)
BUN SERPL-MCNC: 27 MG/DL (ref 8–23)
BUN/CREAT SERPL: 23.5 (ref 7–25)
CALCIUM SPEC-SCNC: 9.1 MG/DL (ref 8.6–10.5)
CHLORIDE SERPL-SCNC: 97 MMOL/L (ref 98–107)
CO2 SERPL-SCNC: 22 MMOL/L (ref 22–29)
CREAT SERPL-MCNC: 1.15 MG/DL (ref 0.76–1.27)
DEPRECATED RDW RBC AUTO: 55.6 FL (ref 37–54)
EGFRCR SERPLBLD CKD-EPI 2021: 68.9 ML/MIN/1.73
EOSINOPHIL # BLD AUTO: 0.02 10*3/MM3 (ref 0–0.4)
EOSINOPHIL NFR BLD AUTO: 0.4 % (ref 0.3–6.2)
ERYTHROCYTE [DISTWIDTH] IN BLOOD BY AUTOMATED COUNT: 12.3 % (ref 12.3–15.4)
FERRITIN SERPL-MCNC: 395 NG/ML (ref 30–400)
GLOBULIN UR ELPH-MCNC: 2.8 GM/DL
GLUCOSE SERPL-MCNC: 111 MG/DL (ref 65–99)
HCT VFR BLD AUTO: 33.1 % (ref 37.5–51)
HGB BLD-MCNC: 11.1 G/DL (ref 13–17.7)
IMM GRANULOCYTES # BLD AUTO: 0.01 10*3/MM3 (ref 0–0.05)
IMM GRANULOCYTES NFR BLD AUTO: 0.2 % (ref 0–0.5)
IRON 24H UR-MRATE: 55 MCG/DL (ref 59–158)
IRON SATN MFR SERPL: 17 % (ref 20–50)
LYMPHOCYTES # BLD AUTO: 1.51 10*3/MM3 (ref 0.7–3.1)
LYMPHOCYTES NFR BLD AUTO: 30.3 % (ref 19.6–45.3)
MCH RBC QN AUTO: 40.8 PG (ref 26.6–33)
MCHC RBC AUTO-ENTMCNC: 33.5 G/DL (ref 31.5–35.7)
MCV RBC AUTO: 121.7 FL (ref 79–97)
MONOCYTES # BLD AUTO: 0.5 10*3/MM3 (ref 0.1–0.9)
MONOCYTES NFR BLD AUTO: 10 % (ref 5–12)
NEUTROPHILS NFR BLD AUTO: 2.93 10*3/MM3 (ref 1.7–7)
NEUTROPHILS NFR BLD AUTO: 58.7 % (ref 42.7–76)
PLATELET # BLD AUTO: 363 10*3/MM3 (ref 140–450)
PMV BLD AUTO: 8.1 FL (ref 6–12)
POTASSIUM SERPL-SCNC: 5.3 MMOL/L (ref 3.5–5.2)
PROT SERPL-MCNC: 7 G/DL (ref 6–8.5)
RBC # BLD AUTO: 2.72 10*6/MM3 (ref 4.14–5.8)
SODIUM SERPL-SCNC: 130 MMOL/L (ref 136–145)
TIBC SERPL-MCNC: 331 MCG/DL (ref 298–536)
TRANSFERRIN SERPL-MCNC: 222 MG/DL (ref 200–360)
WBC NRBC COR # BLD AUTO: 4.99 10*3/MM3 (ref 3.4–10.8)

## 2024-10-29 PROCEDURE — 83540 ASSAY OF IRON: CPT

## 2024-10-29 PROCEDURE — 80053 COMPREHEN METABOLIC PANEL: CPT

## 2024-10-29 PROCEDURE — 82728 ASSAY OF FERRITIN: CPT

## 2024-10-29 PROCEDURE — 36415 COLL VENOUS BLD VENIPUNCTURE: CPT

## 2024-10-29 PROCEDURE — 85025 COMPLETE CBC W/AUTO DIFF WBC: CPT

## 2024-10-29 PROCEDURE — 84466 ASSAY OF TRANSFERRIN: CPT

## 2024-10-29 RX ORDER — HYDROXYUREA 500 MG/1
500 CAPSULE ORAL 2 TIMES DAILY
Qty: 180 CAPSULE | Refills: 2 | Status: SHIPPED | OUTPATIENT
Start: 2024-10-29

## 2024-10-29 NOTE — PROGRESS NOTES
[facility] GROUP HEMATOLOGY & ONCOLOGY  [address]  [place] [address] [phone]    Patient Name: Austin Walton  Encounter Date: 10/29/2024   YOB: 1955  Patient Number: [id number]    Hematology / Oncology Progress Note    HPI / REASON FOR VISIT: Austin Walton is a 69 y.o. 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   He is currently on Hydrea 500mg BID.     He had colonscopy 4/14 at [facility], Dr. Chaudhari. Wife reports it was unremarkable other than slightly enlarged prostate.      He has smoked for over 50 years and smokes approx 1 PPD or a little less.   He does report some chronic fatigue but has no acute complaint.    Pt has had platelets consistently above 500.  Since March 2023, they have been above 600.  Iron was corrected and platelets remained elevated.    (3/14/23:  622, 05/09/23:  612).  As pt is >60  years old with increased cardiovascular risk with hyperlipidemia, hypertension, he was started on Hydrea May 9, 2023.     Pt previously had [facility] Myeloid panel which was positive for TP53. Discussed with him the significance of this mutation.      INTERVAL HISTORY      History of Present Illness  The patient is a 69-year-old male who is followed by this office for essential thrombocytosis, which is JAK2 and MPL mutation negative but has a TP53 mutation.    He is currently taking Hydrea 500 mg twice a day and has been tolerating it well. He recently refilled his Hydrea prescription and requests another refill. He also takes oral iron supplements every other day due to an element of iron deficiency. He had labs drawn today, and the results will be reviewed with him in the office.    He reports no significant health issues since his last visit, except for the appearance of itchy spots on his leg and back.  When scratched, these spots leave scars. He has not applied any treatment to these spots.    He admits to smoking half a pack of cigarettes daily. He does not take any diuretics and reports no alcohol consumption. His daily caffeine intake is approximately 10 cups of coffee. He has reduced his salt intake.         LABS    Lab Results - Last 18 Months   Lab Units 10/29/24  0851 07/30/24  1408 04/30/24  0744 01/30/24  1035 10/31/23  1321 10/03/23  1258 09/06/23  1258 07/13/23  1235 06/06/23  1245 05/09/23  1009   HEMOGLOBIN g/dL 11.1* 11.1* 11.8* 12.8* 13.1 12.7* 13.1 13.6 13.0 13.9   HEMATOCRIT % 33.1* 31.6* 34.8* 37.4* 39.7 38.7 39.8 41.4 40.5 44.3   MCV fL 121.7* 114.9* 119.6* 113.3* 107.6* 105.7* 103.1* 95.8 91.6 92.9   WBC 10*3/mm3 4.99 5.06 5.42 5.41 5.45 7.22 7.59 6.00 5.58 7.42   RDW % 12.3 12.6 12.3 14.9 13.6 13.7 16.3* 18.2* 14.6 13.1   MPV fL 8.1 8.0 8.2 8.1 8.2 8.2 8.3 8.1 8.0 8.5   PLATELETS 10*3/mm3 363 367 346 340 390 458* 535* 457* 514* 612*   IMM GRAN % % 0.2 0.4  --   --   --   --  0.1 0.3 0.4 0.4   NEUTROS ABS 10*3/mm3 2.93 2.52 2.88 2.76 2.85 3.86 4.75 3.13 2.85 4.13   LYMPHS ABS 10*3/mm3 1.51 1.97 1.93  --  2.04  --  2.11 2.15 2.03 2.44   MONOS ABS 10*3/mm3 0.50 0.50 0.54  --  0.44  --  0.60 0.58 0.54 0.66   EOS ABS 10*3/mm3 0.02 0.04 0.02  --  0.08 0.07 0.09 0.08 0.09 0.13   BASOS ABS 10*3/mm3 0.02 0.01 0.02 0.11 0.02 0.07 0.03 0.04 0.05 0.03   IMMATURE GRANS (ABS) 10*3/mm3 0.01 0.02  --   --   --   --  0.01 0.02 0.02 0.03   NRBC /100 WBC  --  0.0  --   --   --   --  0.0 0.0 0.0 0.0   NEUTROPHIL % %  --   --   --  50.0  --  53.5  --   --   --   --    MONOCYTES % %  --   --   --  8.0  --  8.1  --   --   --   --    BASOPHIL % %  --   --   --  2.0*  --  1.0  --   --   --   --    ATYP LYMPH % %  --   --   --  2.0  --  1.0  --   --   --   --    ANISOCYTOSIS   --   --   --   --   --  Slight/1+  --   --   --   --        Lab Results - Last 18 Months   Lab Units 10/29/24  0851 07/30/24  5096  04/30/24  0744 01/30/24  1035 10/31/23  1321 10/03/23  1258   GLUCOSE mg/dL 111* 85 113* 94 92 96   SODIUM mmol/L 130* 133* 132* 131* 140 136   POTASSIUM mmol/L 5.3* 4.3 5.4* 4.8 5.1 5.1   CO2 mmol/L 22.0 23.0 24.0 24.0 26.0 24.0   CHLORIDE mmol/L 97* 99 98 96* 105 103   ANION GAP mmol/L 11.0 11.0 10.0 11.0 9.0 9.0   CREATININE mg/dL 1.15 0.92 0.86 0.78 0.86 0.84   BUN mg/dL 27* 24* 18 20 23 20   BUN / CREAT RATIO  23.5 26.1* 20.9 25.6* 26.7* 23.8   CALCIUM mg/dL 9.1 9.2 9.2 9.0 9.5 9.4   ALK PHOS U/L 80 74 80 78 73 68   TOTAL PROTEIN g/dL 7.0 7.2 7.4 7.6 7.3 7.3   ALT (SGPT) U/L 15 15 16 13 16 14   AST (SGOT) U/L 16 15 18 13 16 18   BILIRUBIN mg/dL 0.4 0.4 0.4 0.6 0.4 0.8   ALBUMIN g/dL 4.2 4.2 4.5 4.4 4.4 4.3   GLOBULIN gm/dL 2.8 3.0 2.9 3.2 2.9 3.0       Lab Results - Last 18 Months   Lab Units 01/30/24  1035 05/09/23  1140   REFERENCE LAB REPORT  See Attached Report See Attached Report       Lab Results - Last 18 Months   Lab Units 10/29/24  0851 07/30/24  1408 04/30/24  0744 01/30/24  1035 09/06/23  1258 06/06/23  1245   IRON mcg/dL 55* 62 103 95 91 76   TIBC mcg/dL 331 334 361 365 377 346   IRON SATURATION (TSAT) % 17* 19* 29 26 24 22   FERRITIN ng/mL 395.00 394.70 440.20* 352.10 224.60 172.30         PAST MEDICAL HISTORY:  ALLERGIES:  No Known Allergies  CURRENT MEDICATIONS:  Outpatient Encounter Medications as of 10/29/2024   Medication Sig Dispense Refill    aspirin 81 MG EC tablet Take 1 tablet by mouth Daily.      FeroSul 325 (65 Fe) MG tablet Take 1 tablet by mouth once daily with breakfast 90 tablet 0    hydroxyurea (HYDREA) 500 MG capsule Take 1 capsule by mouth 2 (Two) Times a Day. 180 capsule 2    ibuprofen (ADVIL,MOTRIN) 200 MG tablet Take 1 tablet by mouth Every 6 (Six) Hours As Needed for Mild Pain.      lisinopril (PRINIVIL,ZESTRIL) 20 MG tablet Take 1 tablet by mouth Daily.      loratadine (CLARITIN) 10 MG tablet Take 1 tablet by mouth Daily.      multivitamin with minerals (PRESERVISION AREDS  PO) Take 1 tablet by mouth Daily.      rosuvastatin (CRESTOR) 40 MG tablet Take 1 tablet by mouth Daily.      vitamin D (ERGOCALCIFEROL) 1.25 MG (90304 UT) capsule capsule Take 1 capsule by mouth 1 (One) Time Per Week.      [DISCONTINUED] hydroxyurea (HYDREA) 500 MG capsule Take 1 capsule by mouth twice daily 180 capsule 0     No facility-administered encounter medications on file as of 10/29/2024.     ADULT ILLNESSES:  Patient Active Problem List   Diagnosis Code    Hyperlipidemia E78.5     SURGERIES:  Past Surgical History:   Procedure Laterality Date    ANKLE SURGERY      screws in ankles    CARPAL TUNNEL RELEASE      COLONOSCOPY  04/13/2022    HERNIA REPAIR      TOE AMPUTATION       HEALTH MAINTENANCE ITEMS:  Health Maintenance Due   Topic Date Due    LIPID PANEL  Never done    COLORECTAL CANCER SCREENING  Never done    TDAP/TD VACCINES (1 - Tdap) Never done    ZOSTER VACCINE (1 of 2) Never done    Pneumococcal Vaccine 65+ (2 of 2 - PPSV23 or PCV20) 12/14/2020    HEPATITIS C SCREENING  Never done    ANNUAL WELLNESS VISIT  Never done    INFLUENZA VACCINE  08/01/2024    COVID-19 Vaccine (2 - 2023-24 season) 09/01/2024    LUNG CANCER SCREENING  11/02/2024       <no information>  Last Completed Colonoscopy       This patient has no relevant Health Maintenance data.          Immunization History   Administered Date(s) Administered    COVID-19 (MODERNA) 1st,2nd,3rd Dose Monovalent 11/03/2021    Flublok 18+yrs 10/19/2020    Fluzone Quad >6mos (Multi-dose) 10/11/2018    Influenza, Unspecified 11/06/2016, 10/11/2018    Pneumococcal Conjugate 13-Valent (PCV13) 10/19/2020     Last Completed Mammogram       This patient has no relevant Health Maintenance data.              FAMILY HISTORY:  No family history on file.  SOCIAL HISTORY:  Social History     Socioeconomic History    Marital status:    Tobacco Use    Smoking status: Every Day     Current packs/day: 0.50     Average packs/day: 0.5 packs/day for 50.0 years  "(25.0 ttl pk-yrs)     Types: Cigarettes    Smokeless tobacco: Never   Vaping Use    Vaping status: Never Used   Substance and Sexual Activity    Alcohol use: Not Currently    Drug use: Not Currently    Sexual activity: Defer       REVIEW OF SYSTEMS:  Review of Systems   Constitutional:  Negative for fatigue.   Respiratory:  Negative for choking and shortness of breath.    Gastrointestinal:  Negative for blood in stool, nausea and vomiting.   Genitourinary:  Positive for difficulty urinating (Hesitency) and frequency. Negative for hematuria.   Neurological:  Positive for dizziness. Negative for headache.   Hematological:  Does not bruise/bleed easily.       /64   Pulse 74   Temp 97.7 °F (36.5 °C) (Temporal)   Resp 18   Ht 175.3 cm (69\")   Wt 74 kg (163 lb 3.2 oz)   SpO2 97%   BMI 24.10 kg/m²  Body surface area is 1.89 meters squared.    Pain Score    10/29/24 0855   PainSc: 0-No pain         Physical Exam  Constitutional:       Appearance: Normal appearance.   HENT:      Head: Normocephalic.   Eyes:      Extraocular Movements: Extraocular movements intact.   Cardiovascular:      Rate and Rhythm: Normal rate and regular rhythm.   Pulmonary:      Effort: Pulmonary effort is normal.      Breath sounds: Normal breath sounds.   Abdominal:      General: Bowel sounds are normal. There is no distension.      Tenderness: There is no abdominal tenderness.   Musculoskeletal:         General: Normal range of motion.   Skin:     General: Skin is warm and dry.   Neurological:      General: No focal deficit present.      Mental Status: He is alert and oriented to person, place, and time.   Psychiatric:         Mood and Affect: Mood normal.         Behavior: Behavior normal.       I have reviewed the PHYSICAL EXAM and the accuracy of it. No changes since the information was documented.  Berenice Burger, APRN 10/29/2024    Austin Walton reports a pain score of 0.  No intervention indicated.         ASSESSMENT / " PLAN      1. Renal insufficiency    2. Essential thrombocytosis    3. Hyperkalemia    4. Iron deficiency anemia, unspecified iron deficiency anemia type    5. Mutation in TP53 gene    6. Tobacco use            Essential Thrombocytosis  2.  TP 53 Mutation  -LACY 2, CALR, MPL Negative Essential Thrombocytosis  -Labs today: WBC 4.99, Hgb 11.1, HCT 33.1, platelets 363  -Pt is taking Hydrea 500 mg BID  - Stable on current dose     -[facility] Myeloid POSITIVE for TP53 Mutation.  There is an increased chance to develop soft tissue sarcoma, osteosarcoma, female breast cancer, brain tumors, adrenocortical carcinoma (ACC), leukemia, and potentially other types of cancer such as prostate cancer. The chance for cancer may be more than 90%.  -Explained to patient the recommendations which include   -Complete physical and neurologic exams every 6-12 months  -Annual whole body MRI  -Annual brain MRI  -Endoscopy and colonoscopy every 2-5 years beginning by age 25, or 5 years earlier than the youngest age of diagnosis in the family (Colonoscopy was 04/13/2022)  -Annual dermatologic examination beginning by age 18  -Currently, we are unable to perform whole body screening MRIs at this facility.    -He states he receives annual physical and is colonoscopy is up to date.   -Discussed increased screening again previous and pt declined.  Pt is concerned for cost of increased screening.  Patient continues to prefer observation       3.  Iron Deficiency / Macrocytosis   -Pt is taking oral iron QOD.  It was decreased r/t elevated ferritin.    -Labs today :    Hgb 11.1, HCT 33.1, serum iron 55, ferritin 395, sat 17, TIBC 331  -Pt will resume daily iron supplementation as Hgb is dropping and serum iron and sat are low.  Ferritin is adequate at 395.  This is likely due to some systemic inflammation.  Of note patient does smoke.    4.  Tobacco Use   -Currently smoking 1/2 PPD x 51 years  -Advised smoking cessation  -Low Dose CT May 2,  2023  1.No acute abnormality is identified within the thorax.    2.Partially solid/cystic nodule within the right upper lobe measures approximately 7 mm     3.Pulmonary emphysema with scattered bibasilar atelectasis.   Importance of Smoking Cessation discussed with patient and informed patient additional information will be on today's AVS.      5.  Hyperkalemia   6.  Renal Insufficiency   Comprehensive Metabolic Panel (10/29/2024 08:51)   Advised patient to increase water intake and to decrease coffee intake.  -Advised patient to slightly increase his salt intake as his sodium is low as well.  -I have forwarded labs to PCP and am deferring long-term management to her.      PLAN:  Continue Hydrea 500  mg BID.  Refill sent  Resume oral iron once daily   Continue current medications, treatment plans and follow up with PCP and any other providers  Labs only in 3 months.  RTC 6 months  Pre-office labs for CBC, CMP, Iron Profile and Ferritin   Care discussed with patient.  Understanding expressed.  Patient agreeable with plan.    Berenice Burger, [place]  10/29/2024

## 2024-10-29 NOTE — PATIENT INSTRUCTIONS
Mr. Walton     Please increase your water intake. Your kidney function has decreased and your potassium is slightly high.  Also increase your sodium (salt) intake slightly.    Increase your oral iron to once daily.  Continue Hydrea twice daily.      Let me know if you have any questions.    Berenice

## 2025-01-29 ENCOUNTER — LAB (OUTPATIENT)
Dept: INTERNAL MEDICINE | Facility: CLINIC | Age: 70
End: 2025-01-29
Payer: MEDICARE

## 2025-01-29 DIAGNOSIS — D47.3 ESSENTIAL THROMBOCYTOSIS: ICD-10-CM

## 2025-01-29 DIAGNOSIS — E87.5 HYPERKALEMIA: ICD-10-CM

## 2025-01-29 DIAGNOSIS — N28.9 RENAL INSUFFICIENCY: ICD-10-CM

## 2025-01-29 DIAGNOSIS — D50.9 IRON DEFICIENCY ANEMIA, UNSPECIFIED IRON DEFICIENCY ANEMIA TYPE: ICD-10-CM

## 2025-01-30 LAB
ALBUMIN SERPL-MCNC: 4.5 G/DL (ref 3.9–4.9)
ALP SERPL-CCNC: 84 IU/L (ref 44–121)
ALT SERPL-CCNC: 14 IU/L (ref 0–44)
AST SERPL-CCNC: 15 IU/L (ref 0–40)
BASOPHILS # BLD AUTO: 0 X10E3/UL (ref 0–0.2)
BASOPHILS NFR BLD AUTO: 0 %
BILIRUB SERPL-MCNC: 0.3 MG/DL (ref 0–1.2)
BUN SERPL-MCNC: 29 MG/DL (ref 8–27)
BUN/CREAT SERPL: 33 (ref 10–24)
CALCIUM SERPL-MCNC: 9.3 MG/DL (ref 8.6–10.2)
CHLORIDE SERPL-SCNC: 102 MMOL/L (ref 96–106)
CO2 SERPL-SCNC: 21 MMOL/L (ref 20–29)
CREAT SERPL-MCNC: 0.88 MG/DL (ref 0.76–1.27)
EGFRCR SERPLBLD CKD-EPI 2021: 93 ML/MIN/1.73
EOSINOPHIL # BLD AUTO: 0 X10E3/UL (ref 0–0.4)
EOSINOPHIL NFR BLD AUTO: 1 %
ERYTHROCYTE [DISTWIDTH] IN BLOOD BY AUTOMATED COUNT: 11.1 % (ref 11.6–15.4)
FERRITIN SERPL-MCNC: 310 NG/ML (ref 30–400)
GLOBULIN SER CALC-MCNC: 2.6 G/DL (ref 1.5–4.5)
GLUCOSE SERPL-MCNC: 106 MG/DL (ref 70–99)
HCT VFR BLD AUTO: 35.9 % (ref 37.5–51)
HGB BLD-MCNC: 12.3 G/DL (ref 13–17.7)
IMM GRANULOCYTES # BLD AUTO: 0 X10E3/UL (ref 0–0.1)
IMM GRANULOCYTES NFR BLD AUTO: 0 %
IRON SATN MFR SERPL: 26 % (ref 15–55)
IRON SERPL-MCNC: 74 UG/DL (ref 38–169)
LYMPHOCYTES # BLD AUTO: 1.3 X10E3/UL (ref 0.7–3.1)
LYMPHOCYTES NFR BLD AUTO: 21 %
MCH RBC QN AUTO: 41.4 PG (ref 26.6–33)
MCHC RBC AUTO-ENTMCNC: 34.3 G/DL (ref 31.5–35.7)
MCV RBC AUTO: 121 FL (ref 79–97)
MONOCYTES # BLD AUTO: 0.6 X10E3/UL (ref 0.1–0.9)
MONOCYTES NFR BLD AUTO: 10 %
MORPHOLOGY BLD-IMP: ABNORMAL
NEUTROPHILS # BLD AUTO: 4 X10E3/UL (ref 1.4–7)
NEUTROPHILS NFR BLD AUTO: 68 %
PLATELET # BLD AUTO: 388 X10E3/UL (ref 150–450)
POTASSIUM SERPL-SCNC: 5 MMOL/L (ref 3.5–5.2)
PROT SERPL-MCNC: 7.1 G/DL (ref 6–8.5)
RBC # BLD AUTO: 2.97 X10E6/UL (ref 4.14–5.8)
SODIUM SERPL-SCNC: 135 MMOL/L (ref 134–144)
TIBC SERPL-MCNC: 285 UG/DL (ref 250–450)
UIBC SERPL-MCNC: 211 UG/DL (ref 111–343)
WBC # BLD AUTO: 6 X10E3/UL (ref 3.4–10.8)

## 2025-02-04 ENCOUNTER — TELEPHONE (OUTPATIENT)
Dept: ONCOLOGY | Facility: CLINIC | Age: 70
End: 2025-02-04
Payer: MEDICARE

## 2025-02-04 NOTE — TELEPHONE ENCOUNTER
Caller: Austin Walton    Relationship: Self    Best call back number: [phone]    Caller requesting test results: YES    What test was performed: LABS    When was the test performed: 1-29    Where was the test performed: OFFICE     Additional notes: PLEASE ADVISE

## 2025-02-06 NOTE — TELEPHONE ENCOUNTER
Caller: Austin Walton    Relationship: Self    Best call back number: [phone]      Who are you requesting to speak with (clinical staff, provider,  specific staff member): clinical      What was the call regarding: patient calling to check status of lab update requested 2/4/25

## 2025-03-24 DIAGNOSIS — D50.9 IRON DEFICIENCY ANEMIA, UNSPECIFIED IRON DEFICIENCY ANEMIA TYPE: ICD-10-CM

## 2025-03-24 RX ORDER — FERROUS SULFATE 325(65) MG
1 TABLET ORAL
Qty: 90 TABLET | Refills: 3 | Status: SHIPPED | OUTPATIENT
Start: 2025-03-24

## 2025-04-21 ENCOUNTER — LAB (OUTPATIENT)
Dept: INTERNAL MEDICINE | Facility: CLINIC | Age: 70
End: 2025-04-21
Payer: MEDICARE

## 2025-04-21 DIAGNOSIS — D50.9 IRON DEFICIENCY ANEMIA, UNSPECIFIED IRON DEFICIENCY ANEMIA TYPE: ICD-10-CM

## 2025-04-21 DIAGNOSIS — D47.3 ESSENTIAL THROMBOCYTOSIS: ICD-10-CM

## 2025-04-21 DIAGNOSIS — N28.9 RENAL INSUFFICIENCY: Primary | ICD-10-CM

## 2025-04-22 LAB
ALBUMIN SERPL-MCNC: 4.4 G/DL (ref 3.9–4.9)
ALP SERPL-CCNC: 76 IU/L (ref 44–121)
ALT SERPL-CCNC: 22 IU/L (ref 0–44)
AST SERPL-CCNC: 20 IU/L (ref 0–40)
BASOPHILS # BLD AUTO: 0 X10E3/UL (ref 0–0.2)
BASOPHILS NFR BLD AUTO: 0 %
BILIRUB SERPL-MCNC: 0.4 MG/DL (ref 0–1.2)
BUN SERPL-MCNC: 26 MG/DL (ref 8–27)
BUN/CREAT SERPL: 26 (ref 10–24)
CALCIUM SERPL-MCNC: 9.1 MG/DL (ref 8.6–10.2)
CHLORIDE SERPL-SCNC: 102 MMOL/L (ref 96–106)
CO2 SERPL-SCNC: 18 MMOL/L (ref 20–29)
CREAT SERPL-MCNC: 1 MG/DL (ref 0.76–1.27)
EGFRCR SERPLBLD CKD-EPI 2021: 81 ML/MIN/1.73
EOSINOPHIL # BLD AUTO: 0 X10E3/UL (ref 0–0.4)
EOSINOPHIL NFR BLD AUTO: 0 %
ERYTHROCYTE [DISTWIDTH] IN BLOOD BY AUTOMATED COUNT: 12 % (ref 11.6–15.4)
FERRITIN SERPL-MCNC: 299 NG/ML (ref 30–400)
GLOBULIN SER CALC-MCNC: 2.4 G/DL (ref 1.5–4.5)
GLUCOSE SERPL-MCNC: 106 MG/DL (ref 70–99)
HCT VFR BLD AUTO: 33.2 % (ref 37.5–51)
HGB BLD-MCNC: 11.6 G/DL (ref 13–17.7)
IMM GRANULOCYTES # BLD AUTO: 0 X10E3/UL (ref 0–0.1)
IMM GRANULOCYTES NFR BLD AUTO: 0 %
IRON SATN MFR SERPL: 22 % (ref 15–55)
IRON SERPL-MCNC: 62 UG/DL (ref 38–169)
LYMPHOCYTES # BLD AUTO: 1.4 X10E3/UL (ref 0.7–3.1)
LYMPHOCYTES NFR BLD AUTO: 31 %
MCH RBC QN AUTO: 40.3 PG (ref 26.6–33)
MCHC RBC AUTO-ENTMCNC: 34.9 G/DL (ref 31.5–35.7)
MCV RBC AUTO: 115 FL (ref 79–97)
MONOCYTES # BLD AUTO: 0.4 X10E3/UL (ref 0.1–0.9)
MONOCYTES NFR BLD AUTO: 10 %
NEUTROPHILS # BLD AUTO: 2.7 X10E3/UL (ref 1.4–7)
NEUTROPHILS NFR BLD AUTO: 59 %
PLATELET # BLD AUTO: 361 X10E3/UL (ref 150–450)
POTASSIUM SERPL-SCNC: 5.1 MMOL/L (ref 3.5–5.2)
PROT SERPL-MCNC: 6.8 G/DL (ref 6–8.5)
RBC # BLD AUTO: 2.88 X10E6/UL (ref 4.14–5.8)
SODIUM SERPL-SCNC: 137 MMOL/L (ref 134–144)
TIBC SERPL-MCNC: 287 UG/DL (ref 250–450)
UIBC SERPL-MCNC: 225 UG/DL (ref 111–343)
WBC # BLD AUTO: 4.6 X10E3/UL (ref 3.4–10.8)

## 2025-04-29 ENCOUNTER — OFFICE VISIT (OUTPATIENT)
Dept: ONCOLOGY | Facility: CLINIC | Age: 70
End: 2025-04-29
Payer: MEDICARE

## 2025-04-29 VITALS
OXYGEN SATURATION: 96 % | RESPIRATION RATE: 16 BRPM | TEMPERATURE: 97.6 F | WEIGHT: 173.5 LBS | DIASTOLIC BLOOD PRESSURE: 78 MMHG | HEART RATE: 77 BPM | BODY MASS INDEX: 25.7 KG/M2 | HEIGHT: 69 IN | SYSTOLIC BLOOD PRESSURE: 146 MMHG

## 2025-04-29 DIAGNOSIS — Z72.0 TOBACCO USE: ICD-10-CM

## 2025-04-29 DIAGNOSIS — Z15.09 MUTATION IN TP53 GENE: ICD-10-CM

## 2025-04-29 DIAGNOSIS — D47.3 ESSENTIAL THROMBOCYTOSIS: Primary | ICD-10-CM

## 2025-04-29 DIAGNOSIS — Z15.01 MUTATION IN TP53 GENE: ICD-10-CM

## 2025-04-29 DIAGNOSIS — Z15.89 MUTATION IN TP53 GENE: ICD-10-CM

## 2025-04-29 DIAGNOSIS — D50.9 IRON DEFICIENCY ANEMIA, UNSPECIFIED IRON DEFICIENCY ANEMIA TYPE: ICD-10-CM

## 2025-04-29 NOTE — PROGRESS NOTES
[facility] HEMATOLOGY & ONCOLOGY  [address]  [place] [address] [phone]    Patient Name: Austin Walton  Encounter Date: 04/29/2025   YOB: 1955  Patient Number: [id number]    Hematology / Oncology Progress Note    HPI / REASON FOR VISIT: Austin Walton is a 69 y.o. 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   He is currently on Hydrea 500mg BID.     He had colonscopy 4/14 at [facility], Dr. Chaudhari. Wife reports it was unremarkable other than slightly enlarged prostate.      He has smoked for over 50 years and smokes approx 1 PPD or a little less.   He does report some chronic fatigue but has no acute complaint.    Pt has had platelets consistently above 500.  Since March 2023, they have been above 600.  Iron was corrected and platelets remained elevated.    (3/14/23:  622, 05/09/23:  612).  As pt is >60  years old with increased cardiovascular risk with hyperlipidemia, hypertension, he was started on Hydrea May 9, 2023.     Pt previously had [employer] Myeloid panel which was positive for TP53. Discussed with him the significance of this mutation.  There is an increased chance to develop soft tissue sarcoma, osteosarcoma, female breast cancer, brain tumors, adrenocortical carcinoma (ACC), leukemia, and potentially other types of cancer such as prostate cancer. The chance for cancer may be more than 90%.  -Explained to patient the recommendations which include   -Complete physical and neurologic exams every 6-12 months  -Annual whole body MRI  -Annual brain MRI  -Endoscopy and colonoscopy every 2-5 years beginning by age 25, or 5 years earlier than the youngest age of diagnosis in the family (Colonoscopy was 04/13/2022)  -Annual dermatologic examination beginning by age 18      INTERVAL HISTORY      History of  Present Illness  The patient presents for evaluation of thrombocytosis.    Condition stable with no new issues since last visit. Takes Hydrea twice daily and iron supplement once daily. Interested in reducing medication intake. Dr. Sanchez recommended chest CT at [facility], [place], operated by [facility], but no communication received.         LABS    Lab Results - Last 18 Months   Lab Units 04/21/25  0801 01/29/25  0703 10/29/24  0851 07/30/24  1408 04/30/24  0744 01/30/24  1035   HEMOGLOBIN g/dL 11.6* 12.3* 11.1* 11.1* 11.8* 12.8*   HEMATOCRIT % 33.2* 35.9* 33.1* 31.6* 34.8* 37.4*   MCV fL 115* 121* 121.7* 114.9* 119.6* 113.3*   WBC x10E3/uL 4.6 6.0 4.99 5.06 5.42 5.41   RDW % 12.0 11.1* 12.3 12.6 12.3 14.9   MPV fL  --   --  8.1 8.0 8.2 8.1   PLATELETS x10E3/uL 361 388 363 367 346 340   IMM GRAN % %  --   --  0.2 0.4  --   --    NEUTROS ABS x10E3/uL 2.7 4.0 2.93 2.52 2.88 2.76   LYMPHS ABS x10E3/uL 1.4 1.3 1.51 1.97 1.93  --    MONOS ABS x10E3/uL 0.4 0.6 0.50 0.50 0.54  --    EOS ABS x10E3/uL 0.0 0.0 0.02 0.04 0.02  --    BASOS ABS x10E3/uL 0.0 0.0 0.02 0.01 0.02 0.11   IMMATURE GRANS (ABS) 10*3/mm3  --   --  0.01 0.02  --   --    NRBC /100 WBC  --   --   --  0.0  --   --    NEUTROPHIL % %  --   --   --   --   --  50.0   MONOCYTES % %  --   --   --   --   --  8.0   BASOPHIL % %  --   --   --   --   --  2.0*   ATYP LYMPH % %  --   --   --   --   --  2.0       Lab Results - Last 18 Months   Lab Units 04/21/25  0801 01/29/25  0703 10/29/24  0851 07/30/24  1408 04/30/24  0744 01/30/24  1035   GLUCOSE mg/dL 106* 106* 111* 85 113* 94   SODIUM mmol/L 137 135 130* 133* 132* 131*   POTASSIUM mmol/L 5.1 5.0 5.3* 4.3 5.4* 4.8   CO2 mmol/L 18* 21 22.0 23.0 24.0 24.0   CHLORIDE mmol/L 102 102 97* 99 98 96*   ANION GAP mmol/L  --   --  11.0 11.0 10.0 11.0   CREATININE mg/dL 1.00 0.88 1.15 0.92 0.86 0.78   BUN mg/dL 26 29* 27* 24* 18 20   BUN / CREAT RATIO  26* 33* 23.5 26.1* 20.9 25.6*   CALCIUM mg/dL 9.1 9.3 9.1 9.2 9.2 9.0   ALK PHOS IU/L 76 84 80 74 80 78    TOTAL PROTEIN g/dL 6.8 7.1 7.0 7.2 7.4 7.6   ALT (SGPT) IU/L 22 14 15 15 16 13   AST (SGOT) IU/L 20 15 16 15 18 13   BILIRUBIN mg/dL 0.4 0.3 0.4 0.4 0.4 0.6   ALBUMIN g/dL 4.4 4.5 4.2 4.2 4.5 4.4   GLOBULIN gm/dL  --   --  2.8 3.0 2.9 3.2   GLOBULINREF g/dL 2.4 2.6  --   --   --   --        Lab Results - Last 18 Months   Lab Units 01/30/24  1035   REFERENCE LAB REPORT  See Attached Report       Lab Results - Last 18 Months   Lab Units 04/21/25  0801 01/29/25  0703 10/29/24  0851 07/30/24  1408 04/30/24  0744 01/30/24  1035   IRON mcg/dL  --   --  55* 62 103 95   TIBC ug/dL 287 285 331 334 361 365   IRON SATURATION % 22 26  --   --   --   --    IRON SATURATION (TSAT) %  --   --  17* 19* 29 26   FERRITIN ng/mL 299 310 395.00 394.70 440.20* 352.10         PAST MEDICAL HISTORY:  ALLERGIES:  No Known Allergies  CURRENT MEDICATIONS:  Outpatient Encounter Medications as of 4/29/2025   Medication Sig Dispense Refill    aspirin 81 MG EC tablet Take 1 tablet by mouth Daily.      ferrous sulfate (SV Iron) 325 (65 FE) MG tablet Take 1 tablet by mouth once daily with breakfast 90 tablet 3    hydroxyurea (HYDREA) 500 MG capsule Take 1 capsule by mouth 2 (Two) Times a Day. 180 capsule 2    ibuprofen (ADVIL,MOTRIN) 200 MG tablet Take 1 tablet by mouth Every 6 (Six) Hours As Needed for Mild Pain.      lisinopril (PRINIVIL,ZESTRIL) 20 MG tablet Take 1 tablet by mouth Daily.      loratadine (CLARITIN) 10 MG tablet Take 1 tablet by mouth Daily.      multivitamin with minerals (PRESERVISION AREDS PO) Take 1 tablet by mouth Daily.      rosuvastatin (CRESTOR) 40 MG tablet Take 1 tablet by mouth Daily.      vitamin D (ERGOCALCIFEROL) 1.25 MG (25612 UT) capsule capsule Take 1 capsule by mouth 1 (One) Time Per Week.       No facility-administered encounter medications on file as of 4/29/2025.     ADULT ILLNESSES:  Patient Active Problem List   Diagnosis Code    Hyperlipidemia E78.5     SURGERIES:  Past Surgical History:   Procedure  Laterality Date    ANKLE SURGERY      screws in ankles    CARPAL TUNNEL RELEASE      COLONOSCOPY  04/13/2022    HERNIA REPAIR      TOE AMPUTATION       HEALTH MAINTENANCE ITEMS:  Health Maintenance Due   Topic Date Due    LIPID PANEL  Never done    TDAP/TD VACCINES (1 - Tdap) Never done    COLORECTAL CANCER SCREENING  Never done    ZOSTER VACCINE (1 of 2) Never done    AAA SCREEN ONCE  Never done    Pneumococcal Vaccine 50+ (2 of 2 - PPSV23) 12/14/2020    HEPATITIS C SCREENING  Never done    ANNUAL WELLNESS VISIT  Never done    COVID-19 Vaccine (2 - 2024-25 season) 09/01/2024    LUNG CANCER SCREENING  11/02/2024       <no information>  Last Completed Colonoscopy    This patient has no relevant Health Maintenance data.       Immunization History   Administered Date(s) Administered    COVID-19 (MODERNA) 1st,2nd,3rd Dose Monovalent 11/03/2021    Flublok 18+yrs 10/19/2020    Fluzone Quad >6mos (Multi-dose) 10/11/2018    Influenza, Unspecified 11/06/2016, 10/11/2018    Pneumococcal Conjugate 13-Valent (PCV13) 10/19/2020     Last Completed Mammogram    This patient has no relevant Health Maintenance data.           FAMILY HISTORY:  No family history on file.  SOCIAL HISTORY:  Social History     Socioeconomic History    Marital status:    Tobacco Use    Smoking status: Every Day     Current packs/day: 0.50     Average packs/day: 0.5 packs/day for 50.0 years (25.0 ttl pk-yrs)     Types: Cigarettes    Smokeless tobacco: Never   Vaping Use    Vaping status: Never Used   Substance and Sexual Activity    Alcohol use: Not Currently    Drug use: Not Currently    Sexual activity: Defer       REVIEW OF SYSTEMS:  Review of Systems   Constitutional:  Negative for fatigue.   Respiratory:  Negative for choking and shortness of breath.    Gastrointestinal:  Negative for blood in stool, nausea and vomiting.   Genitourinary:  Positive for difficulty urinating (Hesitency) and frequency. Negative for hematuria.   Neurological:   "Positive for dizziness. Negative for headache.   Hematological:  Does not bruise/bleed easily.       /78   Pulse 77   Temp 97.6 °F (36.4 °C)   Resp 16   Ht 175.3 cm (69\")   Wt 78.7 kg (173 lb 8 oz)   SpO2 96%   BMI 25.62 kg/m²  Body surface area is 1.94 meters squared.    Pain Score    04/29/25 0905   PainSc: 0-No pain           Physical Exam  Constitutional:       Appearance: Normal appearance.   HENT:      Head: Normocephalic.   Eyes:      Extraocular Movements: Extraocular movements intact.   Cardiovascular:      Rate and Rhythm: Normal rate and regular rhythm.   Pulmonary:      Effort: Pulmonary effort is normal.      Breath sounds: Normal breath sounds.   Abdominal:      General: Bowel sounds are normal. There is no distension.      Tenderness: There is no abdominal tenderness.   Musculoskeletal:         General: Normal range of motion.   Skin:     General: Skin is warm and dry.   Neurological:      General: No focal deficit present.      Mental Status: He is alert and oriented to person, place, and time.   Psychiatric:         Mood and Affect: Mood normal.         Behavior: Behavior normal.       I have reviewed the PHYSICAL EXAM and the accuracy of it. No changes since the information was documented.  Berenice DARÍO Burger, APRN 04/29/2025    Austin Walton reports a pain score of 0.  No intervention indicated.         ASSESSMENT / PLAN      1. 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 Iron deficiency anemia, unspecified iron deficiency anemia type    4. Tobacco use              Essential Thrombocytosis  2.  TP 53 Mutation  -LAYC 2, CALR, MPL Negative Essential Thrombocytosis   -[employer] Myeloid POSITIVE for TP53 Mutation.      -Pt is taking Hydrea 500 mg BID.  Goal is platelets < 400.  -Labs 4/21/25: WBC 4.6, Hgb 11.6, HCT 33.2, platelets 361  - Patient is tolerating Hydrea well.  There is a slight anemia but pt is asymptomatic.    Will continue to monitor.  - Patient will continue " same dose      3.  Iron Deficiency / Macrocytosis   -Labs 4/21/2025: Iron 62, ferritin 299, sat 22, TIBC 287, Hgb 11.6, HCT 33.2  - Iron is stable.  Patient will continue oral iron once daily  - Anemia is persistent, however, likely related to Hydrea use  - Will continue to monitor    4.  Tobacco Use   -Currently smoking 1/2 PPD x 51 years  -Advised smoking cessation   CT Chest Without Contrast Diagnostic (11/02/2023 08:00)   Importance of Smoking Cessation discussed with patient and informed patient additional information will be on today's AVS.             PLAN:  Continue Hydrea 500  mg BID.     Continue oral iron once daily   Continue current medications, treatment plans and follow up with PCP and any other providers  Labs only in 3 months.  RTC 6 months  Pre-office labs for CBC, CMP, Iron Profile and Ferritin   Care discussed with patient.  Understanding expressed.  Patient agreeable with plan.    Berenice Burger, APRN  04/29/2025

## 2025-07-29 ENCOUNTER — LAB (OUTPATIENT)
Dept: INTERNAL MEDICINE | Facility: CLINIC | Age: 70
End: 2025-07-29
Payer: MEDICARE

## 2025-07-29 DIAGNOSIS — D47.3 ESSENTIAL THROMBOCYTOSIS: ICD-10-CM

## 2025-07-29 DIAGNOSIS — D50.9 IRON DEFICIENCY ANEMIA, UNSPECIFIED IRON DEFICIENCY ANEMIA TYPE: ICD-10-CM

## 2025-07-30 LAB
ALBUMIN SERPL-MCNC: 4.3 G/DL (ref 3.9–4.9)
ALP SERPL-CCNC: 71 IU/L (ref 44–121)
ALT SERPL-CCNC: 21 IU/L (ref 0–44)
AST SERPL-CCNC: 19 IU/L (ref 0–40)
BASOPHILS # BLD AUTO: 0 X10E3/UL (ref 0–0.2)
BASOPHILS NFR BLD AUTO: 0 %
BILIRUB SERPL-MCNC: 0.6 MG/DL (ref 0–1.2)
BUN SERPL-MCNC: 21 MG/DL (ref 8–27)
BUN/CREAT SERPL: 21 (ref 10–24)
CALCIUM SERPL-MCNC: 9.4 MG/DL (ref 8.6–10.2)
CHLORIDE SERPL-SCNC: 101 MMOL/L (ref 96–106)
CO2 SERPL-SCNC: 18 MMOL/L (ref 20–29)
CREAT SERPL-MCNC: 1 MG/DL (ref 0.76–1.27)
EGFRCR SERPLBLD CKD-EPI 2021: 81 ML/MIN/1.73
EOSINOPHIL # BLD AUTO: 0 X10E3/UL (ref 0–0.4)
EOSINOPHIL NFR BLD AUTO: 0 %
ERYTHROCYTE [DISTWIDTH] IN BLOOD BY AUTOMATED COUNT: 12.7 % (ref 11.6–15.4)
FERRITIN SERPL-MCNC: 347 NG/ML (ref 30–400)
GLOBULIN SER CALC-MCNC: 2.4 G/DL (ref 1.5–4.5)
GLUCOSE SERPL-MCNC: 99 MG/DL (ref 70–99)
HCT VFR BLD AUTO: 34.9 % (ref 37.5–51)
HGB BLD-MCNC: 12 G/DL (ref 13–17.7)
IMM GRANULOCYTES # BLD AUTO: 0 X10E3/UL (ref 0–0.1)
IMM GRANULOCYTES NFR BLD AUTO: 0 %
IRON SATN MFR SERPL: 29 % (ref 15–55)
IRON SERPL-MCNC: 77 UG/DL (ref 38–169)
LYMPHOCYTES # BLD AUTO: 1.6 X10E3/UL (ref 0.7–3.1)
LYMPHOCYTES NFR BLD AUTO: 32 %
MCH RBC QN AUTO: 41.1 PG (ref 26.6–33)
MCHC RBC AUTO-ENTMCNC: 34.4 G/DL (ref 31.5–35.7)
MCV RBC AUTO: 120 FL (ref 79–97)
MONOCYTES # BLD AUTO: 0.5 X10E3/UL (ref 0.1–0.9)
MONOCYTES NFR BLD AUTO: 10 %
NEUTROPHILS # BLD AUTO: 2.8 X10E3/UL (ref 1.4–7)
NEUTROPHILS NFR BLD AUTO: 58 %
PLATELET # BLD AUTO: 404 X10E3/UL (ref 150–450)
POTASSIUM SERPL-SCNC: 5.2 MMOL/L (ref 3.5–5.2)
PROT SERPL-MCNC: 6.7 G/DL (ref 6–8.5)
RBC # BLD AUTO: 2.92 X10E6/UL (ref 4.14–5.8)
SODIUM SERPL-SCNC: 134 MMOL/L (ref 134–144)
TIBC SERPL-MCNC: 270 UG/DL (ref 250–450)
UIBC SERPL-MCNC: 193 UG/DL (ref 111–343)
WBC # BLD AUTO: 4.9 X10E3/UL (ref 3.4–10.8)